# Patient Record
Sex: MALE | Race: WHITE | Employment: OTHER | URBAN - METROPOLITAN AREA
[De-identification: names, ages, dates, MRNs, and addresses within clinical notes are randomized per-mention and may not be internally consistent; named-entity substitution may affect disease eponyms.]

---

## 2017-02-21 ENCOUNTER — DOCUMENTATION ONLY (OUTPATIENT)
Dept: VASCULAR SURGERY | Facility: CLINIC | Age: 67
End: 2017-02-21

## 2017-04-19 ENCOUNTER — DOCUMENTATION ONLY (OUTPATIENT)
Dept: OTHER | Facility: CLINIC | Age: 67
End: 2017-04-19

## 2017-04-19 DIAGNOSIS — Z71.89 ACP (ADVANCE CARE PLANNING): Chronic | ICD-10-CM

## 2017-05-01 ENCOUNTER — TELEPHONE (OUTPATIENT)
Dept: FAMILY MEDICINE | Facility: CLINIC | Age: 67
End: 2017-05-01

## 2017-05-01 NOTE — LETTER
[facility]  [address] [place] [id number]  Phone: [phone]   May 9, 2017    Ernie Landeros  [address] [place] [address]      To Whom it May Concern,      We are writing this appeal for the coverage on the above patients claim for Abdominal Aortic Aneurysm.  This patient meets the criteria due to a family history of abdominal aneurysm.   Please reconsider the claim. Call with any questions. [phone]    Sincerely,          Nitin Chopra M.D.

## 2017-05-01 NOTE — TELEPHONE ENCOUNTER
Reason for Call:  Form, our goal is to have forms completed with 72 hours, however, some forms may require a visit or additional information.    Type of letter, form or note:  Appeal for Medicare    Who is the form from?: Patient    Where did the form come from: Patient or family brought in       What clinic location was the form placed at?: [place]    Where the form was placed: Pt called in    What number is listed as a contact on the form?: US 12/26/17 was denied and Pt is appealing it. The document says that the can send a note of why the US was ordered. Pt will pick the letter up. I asked if there was a form for the  To fill out and he said no.       Additional comments: Pt phone [phone]    Call taken on 5/1/2017 at 2:28 PM by Mary Hartley

## 2017-05-09 NOTE — TELEPHONE ENCOUNTER
Letter written.  Placed at . Message left at .      Terri Pagan RN    [facility]

## 2017-07-05 ENCOUNTER — TELEPHONE (OUTPATIENT)
Dept: FAMILY MEDICINE | Facility: CLINIC | Age: 67
End: 2017-07-05

## 2017-07-05 NOTE — TELEPHONE ENCOUNTER
Dizziness      Duration: x 4 days    Description   Feeling faint:  no   Feeling like the surroundings are moving: YES  Loss of consciousness or falls: no     Intensity:  mild    Accompanying signs and symptoms:   Nausea/vomitting: no   Palpitations: no   Weakness in arms or legs: no   Vision or speech changes: no   Ringing in ears (Tinnitus): no   Hearing loss related to dizziness: no   Other (fevers/chills/sweating/dyspnea): no     History (similar episodes/head trauma/previous evaluation/recent bleeding): Had once in the distant past    Precipitating or alleviating factors (new meds/chemicals): None  Worse with activity/head movement: YES, especially when moving head side to side when he is laying down    Therapies tried and outcome: Moving slowly and trying to not move head suddenly helps and laying down with eyes close helps        Patient denies chest pain, head trauma, diarrhea, loss of hearing, earache, weakness or numbness in arms or legs.  Denies fainting or loss of consciousness.    He has been able to drive as long as he does not move head suddenly side to side.    Home care instructions given to patient:   Sit up or stand slowly. Avoid any sudden changes in posture.  Avoid noisy environments.  Limit caffeine and alcohol intake.  Drink plenty of water or sports beverage if dehydrated.  Use a night light if needing to get up at night to avoid falling.  When you feel  Dizzy attack coming on stop moving for a few minutes. Reach out and lightly touch something solid and firm then sit down and stay still    Patient advised to be seen in clinic if problem persists more than one week or if dizziness worsens or if vomiting develops.  Patient advised to go to ER if he develops chest pain, decreased level of consciousness, weakness or difficulty speaking, heart rate lower than 50 or greater than 130 or an irregular heart rhythm.        Patient verbalized understanding and agrees with plan.    SELENE Yusuf,  RN, N  [facility]

## 2017-07-06 ENCOUNTER — OFFICE VISIT (OUTPATIENT)
Dept: FAMILY MEDICINE | Facility: CLINIC | Age: 67
End: 2017-07-06
Payer: COMMERCIAL

## 2017-07-06 VITALS
BODY MASS INDEX: 24.77 KG/M2 | OXYGEN SATURATION: 96 % | WEIGHT: 173 LBS | TEMPERATURE: 98.5 F | SYSTOLIC BLOOD PRESSURE: 102 MMHG | HEIGHT: 70 IN | DIASTOLIC BLOOD PRESSURE: 74 MMHG | HEART RATE: 71 BPM

## 2017-07-06 DIAGNOSIS — H81.10 BPPV (BENIGN PAROXYSMAL POSITIONAL VERTIGO), UNSPECIFIED LATERALITY: Primary | ICD-10-CM

## 2017-07-06 PROCEDURE — 99213 OFFICE O/P EST LOW 20 MIN: CPT | Performed by: FAMILY MEDICINE

## 2017-07-06 RX ORDER — MECLIZINE HYDROCHLORIDE 25 MG/1
25 TABLET ORAL EVERY 6 HOURS PRN
Qty: 30 TABLET | Refills: 1 | COMMUNITY
Start: 2017-07-06 | End: 2018-08-05

## 2017-07-06 NOTE — PROGRESS NOTES
SUBJECTIVE:                                                    Ernie Landeros is a 66 year old male who presents to clinic today for the following health issues:    Dizziness  Onset: 5 days    Description:   Do you feel faint:  no   Does it feel like the surroundings (bed, room) are moving: YES   Unsteady/off balance: YES- in the beginning was worse  Have you passed out or fallen: YES- almost    Intensity: mild, moderate    Progression of Symptoms:  improving    Accompanying Signs & Symptoms:  Heart palpitations: no   Nausea, vomiting: no   Weakness in arms or legs: no   Fatigue: no   Vision or speech changes: no   Ringing in ears (Tinnitus): no   Hearing Loss: no     History:   Head trauma/concussion hx: no   Previous similar symptoms: YES- last year for a day or so  Recent bleeding history: no     Precipitating factors:   Worse with activity or head movement: YES- makes vision blurry side to side - up and down  Any new medications (BP?): no   Alcohol/drug abuse/withdrawal: no     Alleviating factors:   Does staying in a fixed position give relief:  no     Therapies Tried and outcome: ibuprofen for dental work    The patient states that he has been experiencing dizziness with change in position and movement. He also has been feeling unsteady and having vision changes with symptoms. If the patient is in the supine position, he denies symptoms. He denies recent illness and tinnitus.     Chest Pain - Approximately one week ago the patient started having bilateral lower chest pain that lasted for five minutes while driving to work. He also experienced a cold sweat. He denies trauma to the area, nausea, heart palpitations, and cramping. The patient exercises regularly and he denies chest pain with exertion.       Problem list and histories reviewed & adjusted, as indicated.  Additional history: as documented      ROS:  Constitutional, HEENT, cardiovascular, pulmonary, GI, , musculoskeletal, neuro, skin, endocrine  "and psych systems are negative, except as otherwise noted.    This document serves as a record of the services and decisions personally performed and made by Fredrick Butt MD. It was created on his behalf by Ariella Baig, a trained medical scribe. The creation of this document is based on the provider's statements to the medical scribe.  Ariella Baig 4:39 PM 7/6/2017  OBJECTIVE:                                                    /74 (BP Location: Left arm, Patient Position: Chair, Cuff Size: Adult Large)  Pulse 71  Temp 98.5  F (36.9  C) (Oral)  Ht 1.778 m (5' 10\")  Wt 78.5 kg (173 lb)  SpO2 96%  BMI 24.82 kg/m2 Body mass index is 24.82 kg/(m^2).   GENERAL: healthy, alert, well nourished, well hydrated, no distress  HENT: positive David Hallpike test, otherwise ear canals- normal; TMs- normal; Nose- normal; Mouth- no ulcers, no lesions  NECK: no carotid bruit, no tenderness, no adenopathy, no asymmetry, no masses, no stiffness; thyroid- normal to palpation  RESP: lungs clear to auscultation - no rales, no rhonchi, no wheezes  CV: regular rates and rhythm, normal S1 S2, no S3 or S4 and no murmur, no click or rub -  ABDOMEN: soft, no tenderness, no  hepatosplenomegaly, no masses, normal bowel sounds  MS: extremities- no gross deformities noted, no edema  SKIN: no suspicious lesions, no rashes    Diagnostic test results:  none      ASSESSMENT/PLAN:         Ernie was seen today for dizziness.    Diagnoses and all orders for this visit:    BPPV (benign paroxysmal positional vertigo), unspecified laterality - Take meclizine as needed for dizziness. Consider physical therapy if symptoms persist.  -     meclizine (ANTIVERT) 25 MG tablet; Take 1 tablet (25 mg) by mouth every 6 hours as needed for dizziness      Risks, benefits and alternatives of treatments discussed. Plan agreed on.      Followup: As needed    Will call, return to clinic, or go to ED if worsening or symptoms not improving as " discussed.    See patient instructions.       The patient has no Health Maintenance topics of status Overdue, Due On, or Due Soon    Health maintenance reviewed/updated? Yes    The information in this document, created by a scribe for me, accurately reflects the services I personally performed and the decisions made by me. I have reviewed and approved this document for accuracy.      Norbert Butt MD

## 2017-07-06 NOTE — MR AVS SNAPSHOT
After Visit Summary   7/6/2017    Ernie Landeros    MRN: [id number]           Patient Information     Date Of Birth          1950        Visit Information        Provider Department      7/6/2017 4:20 PM Fredrick Butt MD [facility]        Today's Diagnoses     BPPV (benign paroxysmal positional vertigo), unspecified laterality    -  1      Care Instructions                Positional Vertigo          What is positional vertigo?   Positional vertigo is an inner ear problem. It causes brief but sometimes severe feelings of spinning. Some people feel that their head or body is spinning. Others feel the room is spinning. People often say they are dizzy, but dizzy is a very general term. Vertigo, on the other hand, is the very specific feeling of uncontrollable spinning.   Symptoms of positional vertigo happen suddenly when you change the position of your head.   How does it occur?   In the inner part of your ear are 3 semicircular canals. Movement of the fluid in these canals helps your brain maintain your balance and know what position you are in (for example, standing up, lying down, or standing on your head).   Sometimes small crystals of calcium develop and float in the fluid in the inner ear. This can happen after a head injury, with a severe cold, or simply as a part of normal aging. The crystals can cause vertigo when you change head position and they strike against nerve endings in the semicircular canals. Usually the calcium crystals dissolve in a few weeks and stop causing vertigo. However, sometimes the crystals do not dissolve and the vertigo returns from time to time.   What are the symptoms?   A sudden feeling that you are spinning, or that the room is spinning, is the main symptom. You may feel the vertigo when you first wake up. It may seem that any turn of your head brings on brief but intense spells of vertigo. It may happen when you tilt your head, look up or  down, or roll over in bed.   You may have nausea and vomiting along with the vertigo. Even if a spell of vertigo is brief, you may have a feeling of queasiness for several minutes or even hours afterward.   How is it diagnosed?   Your healthcare provider will ask about your symptoms and examine you. You may also be given a David-Hallpike position test.   You start the [place]-Hallpike test by sitting upright on the examining table. Your healthcare provider slowly brings your head down over the edge of the table and turns your head to one side. If you have positional vertigo, your provider will see your eyes making fast, jerky movements called nystagmus. If no nystagmus is seen, your provider will repeat the test, this time turning your head to the opposite side, to test the other inner ear. If you then have nystagmus and vertigo, the ear that is pointing toward the floor is the one causing the problem. The nystagmus and vertigo will slow down and stop after 15 to 20 seconds. If you do not move your head, no more symptoms will occur. When you sit back up, you will have vertigo again, but for a shorter time.   Other tests you may have are:   an ear exam   an audiogram to check your hearing   a test of your nerve responses   an electronystagmogram (ENG) test.   How is it treated?   Mild vertigo is often treated with medicine. The most common medicine for this problem is meclizine. It is taken up to 4 times a day for the vertigo and nausea or vomiting. One of the problems with this medicine is that it causes drowsiness. This is not as much of a problem if you have severe vertigo, which usually requires bed rest. Then the medicine can help you sleep and get relief from the vertigo while you sleep.   Your healthcare provider may recommend techniques that use gravity to move the crystals away from the nerve endings into an area of the inner ear that won't cause any problems. These are called repositioning techniques.   One  repositioning technique is the Epley maneuver. It can be very helpful. Your healthcare provider will move your head into 4 positions. You will hold each position for about 30 seconds.   Your healthcare provider may also suggest that you do Gomez-Daroff exercises. Your provider may recommend that you do these exercises 3 times a day for 2 weeks. To do these exercises:   Start by sitting upright on your bed.   Lie on your left side, with your head angled upward about residential. (Imagine that you are looking at the head of someone standing about 6 feet in front of you.) Stay in this position for 30 seconds, or, if you are having vertigo, until the vertigo stops.   Return to the sitting position for 30 seconds.   Lie on your right side, and follow the same routine.   Your healthcare provider may refer you to a physical therapist to learn and practice these repositioning techniques.   Rarely, when repositioning techniques don't help and the vertigo has not gone away after a few weeks, severe cases may eventually require surgery.   How long will the effects last?   Even without treatment, positional vertigo usually goes away within several weeks. Sometimes it recurs despite treatment.   How do I take care of myself?   If your vertigo is mild, you may be able to continue your usual activities, especially if you have opportunities to sit and rest when you have vertigo.   If your vertigo does not allow you to continue your usual routine, you should rest at home.   Use medicine as prescribed by your healthcare provider to help stop symptoms of dizziness, nausea, and vomiting.   Follow your instructions for using the repositioning techniques.   Do not try to drive, operate tools or machinery, or do other tasks, even cooking, that could endanger yourself or others if you suddenly become dizzy.   Follow your healthcare provider's recommendations for follow-up visits.   Contact your healthcare provider if:   Your symptoms seem to  "be getting worse, more frequent, or longer lasting.   You develop new symptoms, such as a loss of hearing or severe headache.     Published by [employer].  This content is reviewed periodically and is subject to change as new health information becomes available. The information is intended to inform and educate and is not a replacement for medical evaluation, advice, diagnosis or treatment by a healthcare professional.   Developed by [employer].   ? 2010 [facility] and/or its affiliates. All Rights Reserved.   Copyright   Clinical Reference Systems 2011  Adult Health Advisor                 Follow-ups after your visit        Follow-up notes from your care team     Return in about 2 weeks (around 7/20/2017).      Who to contact     If you have questions or need follow up information about today's clinic visit or your schedule please contact [facility] directly at [phone].  Normal or non-critical lab and imaging results will be communicated to you by Gamisfactionhart, letter or phone within 4 business days after the clinic has received the results. If you do not hear from us within 7 days, please contact the clinic through Gamisfactionhart or phone. If you have a critical or abnormal lab result, we will notify you by phone as soon as possible.  Submit refill requests through Baihe or call your pharmacy and they will forward the refill request to us. Please allow 3 business days for your refill to be completed.          Additional Information About Your Visit        Baihe Information     Baihe lets you send messages to your doctor, view your test results, renew your prescriptions, schedule appointments and more. To sign up, go to [email or web address] . Click on \"Log in\" on the left side of the screen, which will take you to the Welcome page. Then click on \"Sign up Now\" on the right side of the page.     You will be asked to enter the access code listed below, as well as some personal information. " "Please follow the directions to create your username and password.     Your access code is: [email or web address]  Expires: 10/4/2017  4:56 PM     Your access code will  in 90 days. If you need help or a new code, please call your [place] clinic or [phone].        Care EveryWhere ID     This is your Care EveryWhere ID. This could be used by other organizations to access your [place] medical records  [id number]        Your Vitals Were     Pulse Temperature Height Pulse Oximetry BMI (Body Mass Index)       71 98.5  F (36.9  C) (Oral) 5' 10\" (1.778 m) 96% 24.82 kg/m2        Blood Pressure from Last 3 Encounters:   17 102/74   16 122/78   09/21/15 116/70    Weight from Last 3 Encounters:   17 173 lb (78.5 kg)   16 180 lb (81.6 kg)   09/21/15 183 lb (83 kg)              Today, you had the following     No orders found for display         Today's Medication Changes          These changes are accurate as of: 17  4:56 PM.  If you have any questions, ask your nurse or doctor.               Start taking these medicines.        Dose/Directions    meclizine 25 MG tablet   Commonly known as:  ANTIVERT   Used for:  BPPV (benign paroxysmal positional vertigo), unspecified laterality   Started by:  Fredrick Butt MD        Dose:  25 mg   Take 1 tablet (25 mg) by mouth every 6 hours as needed for dizziness   Quantity:  30 tablet   Refills:  1            Where to get your medicines      Some of these will need a paper prescription and others can be bought over the counter.  Ask your nurse if you have questions.     You don't need a prescription for these medications     meclizine 25 MG tablet                Primary Care Provider Office Phone # Fax #    Nitin Chopra [phone]       [address] [address]        Equal Access to Services     [facility] AH: Hadii chance willo Soomaali, waaxda luqadaha, qaybta kaalmada anand, kristen " channing olivojermaine aguilaraan ah. So [facility] [phone].    ATENCIÓN: Si jesenia vázquez, tiene a kohli disposición servicios gratuitos de asistencia lingüística. [place] al [phone].    We comply with applicable federal civil rights laws and [place] laws. We do not discriminate on the basis of race, color, national origin, age, disability sex, sexual orientation or gender identity.            Thank you!     Thank you for choosing [facility]  for your care. Our goal is always to provide you with excellent care. Hearing back from our patients is one way we can continue to improve our services. Please take a few minutes to complete the written survey that you may receive in the mail after your visit with us. Thank you!             Your Updated Medication List - Protect others around you: Learn how to safely use, store and throw away your medicines at www.disposemymeds.org.          This list is accurate as of: 7/6/17  4:56 PM.  Always use your most recent med list.                   Brand Name Dispense Instructions for use Diagnosis    ascorbic acid 500 MG tablet    VITAMIN C    100 tablet    Take 1 tablet by mouth daily.        aspirin  MG EC tablet     90 tablet    Take 1 tablet (325 mg) by mouth daily        calcium carbonate 1250 MG tablet    OS-MICHELLE 500 mg [place]. Ca    3 MONTHS    unsure of mgs        fish oil-omega-3 fatty acids 1000 MG capsule      unsure of mgs        Garlic 100 MG Tabs      unsure of mgs        meclizine 25 MG tablet    ANTIVERT    30 tablet    Take 1 tablet (25 mg) by mouth every 6 hours as needed for dizziness    BPPV (benign paroxysmal positional vertigo), unspecified laterality       Saw Plato (Serenoa repens) 1000 MG Caps      unsure of mgs        Vitamin B-12 1000 MCG/ML Soln      po unsure of mgs

## 2017-07-06 NOTE — NURSING NOTE
"Chief Complaint   Patient presents with     Dizziness       Initial /74 (BP Location: Left arm, Patient Position: Chair, Cuff Size: Adult Large)  Pulse 71  Temp 98.5  F (36.9  C) (Oral)  Ht 5' 10\" (1.778 m)  Wt 173 lb (78.5 kg)  SpO2 96%  BMI 24.82 kg/m2 Estimated body mass index is 24.82 kg/(m^2) as calculated from the following:    Height as of this encounter: 5' 10\" (1.778 m).    Weight as of this encounter: 173 lb (78.5 kg).  Medication Reconciliation: complete  "

## 2017-07-06 NOTE — PATIENT INSTRUCTIONS
Positional Vertigo          What is positional vertigo?   Positional vertigo is an inner ear problem. It causes brief but sometimes severe feelings of spinning. Some people feel that their head or body is spinning. Others feel the room is spinning. People often say they are dizzy, but dizzy is a very general term. Vertigo, on the other hand, is the very specific feeling of uncontrollable spinning.   Symptoms of positional vertigo happen suddenly when you change the position of your head.   How does it occur?   In the inner part of your ear are 3 semicircular canals. Movement of the fluid in these canals helps your brain maintain your balance and know what position you are in (for example, standing up, lying down, or standing on your head).   Sometimes small crystals of calcium develop and float in the fluid in the inner ear. This can happen after a head injury, with a severe cold, or simply as a part of normal aging. The crystals can cause vertigo when you change head position and they strike against nerve endings in the semicircular canals. Usually the calcium crystals dissolve in a few weeks and stop causing vertigo. However, sometimes the crystals do not dissolve and the vertigo returns from time to time.   What are the symptoms?   A sudden feeling that you are spinning, or that the room is spinning, is the main symptom. You may feel the vertigo when you first wake up. It may seem that any turn of your head brings on brief but intense spells of vertigo. It may happen when you tilt your head, look up or down, or roll over in bed.   You may have nausea and vomiting along with the vertigo. Even if a spell of vertigo is brief, you may have a feeling of queasiness for several minutes or even hours afterward.   How is it diagnosed?   Your healthcare provider will ask about your symptoms and examine you. You may also be given a David-Hallpike position test.   You start the [place]-Hallpike test by sitting upright  on the examining table. Your healthcare provider slowly brings your head down over the edge of the table and turns your head to one side. If you have positional vertigo, your provider will see your eyes making fast, jerky movements called nystagmus. If no nystagmus is seen, your provider will repeat the test, this time turning your head to the opposite side, to test the other inner ear. If you then have nystagmus and vertigo, the ear that is pointing toward the floor is the one causing the problem. The nystagmus and vertigo will slow down and stop after 15 to 20 seconds. If you do not move your head, no more symptoms will occur. When you sit back up, you will have vertigo again, but for a shorter time.   Other tests you may have are:   an ear exam   an audiogram to check your hearing   a test of your nerve responses   an electronystagmogram (ENG) test.   How is it treated?   Mild vertigo is often treated with medicine. The most common medicine for this problem is meclizine. It is taken up to 4 times a day for the vertigo and nausea or vomiting. One of the problems with this medicine is that it causes drowsiness. This is not as much of a problem if you have severe vertigo, which usually requires bed rest. Then the medicine can help you sleep and get relief from the vertigo while you sleep.   Your healthcare provider may recommend techniques that use gravity to move the crystals away from the nerve endings into an area of the inner ear that won't cause any problems. These are called repositioning techniques.   One repositioning technique is the Epley maneuver. It can be very helpful. Your healthcare provider will move your head into 4 positions. You will hold each position for about 30 seconds.   Your healthcare provider may also suggest that you do Gomez-Daroff exercises. Your provider may recommend that you do these exercises 3 times a day for 2 weeks. To do these exercises:   Start by sitting upright on your bed.    Lie on your left side, with your head angled upward about correction. (Imagine that you are looking at the head of someone standing about 6 feet in front of you.) Stay in this position for 30 seconds, or, if you are having vertigo, until the vertigo stops.   Return to the sitting position for 30 seconds.   Lie on your right side, and follow the same routine.   Your healthcare provider may refer you to a physical therapist to learn and practice these repositioning techniques.   Rarely, when repositioning techniques don't help and the vertigo has not gone away after a few weeks, severe cases may eventually require surgery.   How long will the effects last?   Even without treatment, positional vertigo usually goes away within several weeks. Sometimes it recurs despite treatment.   How do I take care of myself?   If your vertigo is mild, you may be able to continue your usual activities, especially if you have opportunities to sit and rest when you have vertigo.   If your vertigo does not allow you to continue your usual routine, you should rest at home.   Use medicine as prescribed by your healthcare provider to help stop symptoms of dizziness, nausea, and vomiting.   Follow your instructions for using the repositioning techniques.   Do not try to drive, operate tools or machinery, or do other tasks, even cooking, that could endanger yourself or others if you suddenly become dizzy.   Follow your healthcare provider's recommendations for follow-up visits.   Contact your healthcare provider if:   Your symptoms seem to be getting worse, more frequent, or longer lasting.   You develop new symptoms, such as a loss of hearing or severe headache.     Published by [employer].  This content is reviewed periodically and is subject to change as new health information becomes available. The information is intended to inform and educate and is not a replacement for medical evaluation, advice, diagnosis or treatment by a healthcare  professional.   Developed by [employer].   ? 2010 [employer] and/or its affiliates. All Rights Reserved.   Copyright   [employer] 2011  [facility]ult [employer] [facility]

## 2018-02-01 ENCOUNTER — OFFICE VISIT (OUTPATIENT)
Dept: FAMILY MEDICINE | Facility: CLINIC | Age: 68
End: 2018-02-01
Payer: COMMERCIAL

## 2018-02-01 VITALS
SYSTOLIC BLOOD PRESSURE: 112 MMHG | OXYGEN SATURATION: 96 % | WEIGHT: 178 LBS | HEART RATE: 77 BPM | HEIGHT: 70 IN | BODY MASS INDEX: 25.48 KG/M2 | DIASTOLIC BLOOD PRESSURE: 70 MMHG | TEMPERATURE: 98.1 F

## 2018-02-01 DIAGNOSIS — D48.5 NEOPLASM OF UNCERTAIN BEHAVIOR OF SKIN: Primary | ICD-10-CM

## 2018-02-01 DIAGNOSIS — L98.9 CHANGING SKIN LESION: ICD-10-CM

## 2018-02-01 PROCEDURE — 88305 TISSUE EXAM BY PATHOLOGIST: CPT | Performed by: FAMILY MEDICINE

## 2018-02-01 PROCEDURE — 11302 SHAVE SKIN LESION 1.1-2.0 CM: CPT | Performed by: FAMILY MEDICINE

## 2018-02-01 NOTE — NURSING NOTE
"Chief Complaint   Patient presents with     Derm Problem       Initial /70  Pulse 77  Temp 98.1  F (36.7  C) (Oral)  Ht 5' 10\" (1.778 m)  Wt 178 lb (80.7 kg)  SpO2 96%  BMI 25.54 kg/m2 Estimated body mass index is 25.54 kg/(m^2) as calculated from the following:    Height as of this encounter: 5' 10\" (1.778 m).    Weight as of this encounter: 178 lb (80.7 kg).  Medication Reconciliation: complete  "

## 2018-02-01 NOTE — PROGRESS NOTES
SUBJECTIVE:   Ernie Landeros is a 67 year old male who presents to clinic today for the following health issues:      Lesion/mole     Onset: pt unsure, wife just noticed it a couple of weeks ago     Description:   Location: Rt Shoulder lateral mid upper arm        Color: skin colored red with scaling        Border description: irregular border, 11 by 11 mm    History:    Has it spread/changed:  Pt unsure  Any previous history of skin cancer: no  Any family history of melanoma: no    Accompanying Signs & Symptoms:   Fever: no  Bleeding: no  Scaling: no  Excessive sun exposure/tanning: live in [place] in [place]  Sunscreen used: no      Therapies tried and outcome:  Nothing tried       Problem list and histories reviewed & adjusted, as indicated.  Additional history: as documented        Reviewed and updated as needed this visit by clinical staff  Allergies  Meds  Med Hx       Reviewed and updated as needed this visit by Provider         BP Readings from Last 3 Encounters:   02/01/18 112/70   07/06/17 102/74   12/21/16 122/78       Wt Readings from Last 4 Encounters:   02/01/18 178 lb (80.7 kg)   07/06/17 173 lb (78.5 kg)   12/21/16 180 lb (81.6 kg)   09/21/15 183 lb (83 kg)       Health Maintenance    Health Maintenance Due   Topic Date Due     FALL RISK ASSESSMENT  12/21/2017     PSA Q1 YR  12/21/2017     WELLNESS VISIT Q1 YR  12/21/2017     FIT Q1 YR  12/23/2017       Current Problem List    Patient Active Problem List   Diagnosis     CARDIOVASCULAR SCREENING; LDL GOAL LESS THAN 130     ACP (advance care planning)       Past Medical History    Past Medical History:   Diagnosis Date     CARDIOVASCULAR SCREENING; LDL GOAL LESS THAN 130        Past Surgical History    Past Surgical History:   Procedure Laterality Date     HC COLONOSCOPY THRU STOMA, DIAGNOSTIC  12/04, 4/14    normal - Dr Valente - due 2024     HC REMOVE TONSILS/ADENOIDS,<13 Y/O  1958    T & A <13y.o.     HC REPAIR ING HERNIA,5+Y/O,REDUCIBL  1955     bilateral inguinal hernia     SURGICAL HISTORY OF -   8/05    Lt Knee tibial plateua fx - dr morales     SURGICAL HISTORY OF -   2009    Lt carpal tunnel - dr morales     SURGICAL HISTORY OF -   1/14    Rt carpal tunnel - dr morales       Current Medications    Current Outpatient Prescriptions   Medication Sig Dispense Refill     meclizine (ANTIVERT) 25 MG tablet Take 1 tablet (25 mg) by mouth every 6 hours as needed for dizziness 30 tablet 1     aspirin  MG EC tablet Take 1 tablet (325 mg) by mouth daily 90 tablet 3     ascorbic acid (VITAMIN C) 500 MG tablet Take 1 tablet by mouth daily. 100 tablet 12     CALCIUM 500 MG OR TABS unsure of mgs 3 MONTHS 1 YEAR     SAW PALMETTO (SERENOA REPENS) 1000 MG OR CAPS unsure of mgs  0     GARLIC 100 MG OR TABS unsure of mgs  0     FISH OIL 1000 MG OR CAPS unsure of mgs  0     VITAMIN B-12 1000 MCG/ML IJ SOLN po unsure of mgs  0       Allergies    No Known Allergies    Immunizations    Immunization History   Administered Date(s) Administered     Influenza (High Dose) 3 valent vaccine 09/07/2017     Influenza (IIV3) PF 11/25/2003, 10/17/2006, 10/01/2009, 09/22/2010, 09/09/2011, 09/19/2012     Influenza Vaccine IM 3yrs+ 4 Valent IIV4 09/18/2013, 09/18/2014, 10/05/2015     Pneumo Conj 13-V (2010&after) 12/21/2016     Pneumococcal 23 valent 05/02/2014     TD (ADULT, 7+) 10/17/2003     TDAP Vaccine (Boostrix) 05/02/2012     Twinrix A/B 10/17/2003, 11/25/2003, 04/16/2004     Typhoid IM 10/17/2003     Zoster vaccine, live 10/05/2015       Family History    Family History   Problem Relation Age of Onset     Arthritis Mother      Neurologic Disorder Father      Parkinsons     Hypertension Father      Hypertension Brother      Lipids Brother      HEART DISEASE Maternal Grandfather        Social History    Social History     Social History     Marital status:      Spouse name: Ariana     Number of children: 2     Years of education: 18     Occupational History      Self  "    Social History Main Topics     Smoking status: Never Smoker     Smokeless tobacco: Never Used     Alcohol use No     Drug use: No     Sexual activity: Yes     Partners: Female     Other Topics Concern     Caffeine Concern Yes     2 cups a day     Exercise Yes     bikes walks lifts wts 3-5 times per week - 10 miles per week     Seat Belt Yes     Parent/Sibling W/ Cabg, Mi Or Angioplasty Before 65f 55m? No     Social History Narrative       All above reviewed and updated, all stable unless otherwise noted    Recent labs reviewed    ROS:  Constitutional, HEENT, cardiovascular, pulmonary, GI, , musculoskeletal, neuro, skin, endocrine and psych systems are negative, except as otherwise noted.    This document serves as a record of the services and decisions personally performed and made by Nitin Chopra MD. It was created on their behalf by Daniela Walton, a trained medical scribe. The creation of this document is based the provider's statements to the medical scribe.  Daniela Walton February 1, 2018 11:18 AM      OBJECTIVE:                                                    /70  Pulse 77  Temp 98.1  F (36.7  C) (Oral)  Ht 5' 10\" (1.778 m)  Wt 178 lb (80.7 kg)  SpO2 96%  BMI 25.54 kg/m2  Body mass index is 25.54 kg/(m^2).  GENERAL: healthy, alert and no distress  CV/LUNG - Negative  SKIN: red, flat, scaly on lower half lesion on right upper lateral mid arm measuring 11 x 11 mm  DIAGNOSTICS/PROCEDURES:                                                      Procedure:  shave biopsy    Consent:  Risks and benefits of procedure discussed, including:  allergic reaction, bleeding, infection, nerve injury, perforation and scarring    Pattient desires to proceed, pause for cause completed,    Procedure completed in room: shave biopsy    Prep:  vika  Anesthesia:  1% lidocaine with epi    Notes:  Dermablade used to remove the lesion. Hemostasis achieved with aluminum chloride. Patient tolerated " procedure well.     Wound repaired with:  Bacitracin Ointment and Dressing(s)    Wound care discussed    Path Sent:  Yes         ASSESSMENT/PLAN:                                                        ICD-10-CM    1. Neoplasm of uncertain behavior of skin D48.5 BIOPSY SKIN/SUBQ/MUC MEM, SINGLE LESION     Surgical pathology exam   2. Changing skin lesion L98.9 BIOPSY SKIN/SUBQ/MUC MEM, SINGLE LESION     Surgical pathology exam       Discussed treatment/modality options, including risk and benefits, he desires shave biopsy of changing skin lesion, wound cares reviewed. All diagnosis above reviewed and noted above, otherwise stable.  See [employer] orders for further details.  Follow up pending pathology.    Health Maintenance Due   Topic Date Due     FALL RISK ASSESSMENT  12/21/2017     PSA Q1 YR  12/21/2017     WELLNESS VISIT Q1 YR  12/21/2017     FIT Q1 YR  12/23/2017     The information in this document, created by the medical scribe for me, accurately reflects the services I personally performed and the decisions made by me. I have reviewed and approved this document for accuracy.   Nitin Chopra MD FAAFP            Nitin Chopra MD FAA[address]  [id number] [phone] Fax

## 2018-02-01 NOTE — PATIENT INSTRUCTIONS
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     Patient Instructions     WOUND CARE INSTRUCTIONS  1. After 24 hours, change dressing daily.  2. Wash hands before every dressing change.  3. Wash the wound area with a mild soap, then rinse  4. Gently pat dry with a sterile gauze or Q-tip.  5. Apply Vaseline or Aquaphor only over entire wound. Do NOT use Neosporin - as many people react to neomycin.  6. Finally, cover with a bandage or sterile non-stick gauze with micropore paper tape.  7. Repeat once daily until wound has healed.     Do not let the wound dry out.  The wound will heal faster and with better cosmetic results if routinely kept moist with step #5. It is a false belief that a wound heals better when it is exposed to air and allowed to dry out.       Soap, water and shampoo will not hurt this area.    Do not go swimming or take baths, but showering is encouraged.    Limit use of the area where the procedure was done for a few days to allow for optimal healing.     If you experience bleeding:  Repeat steps #2-5 and hold firm pressure on the area for 10 minutes without checking to see if the bleeding has stopped. \"Checking\" pulls off the protective wound clot and restarts the bleeding all over again. Re-apply pressure for 10 minutes if necessary to stop bleeding.  Use additional sterile gauze and tape to maintain pressure once bleeding has stopped.     Signs of Infection:  Infection can occur in any area where skin has been disrupted.  If you notice persistent redness, swelling, colored drainage, increasing pain, fever or other signs of infection, please call us at: [phone] and ask to have me or my colleague paged. We will call you back to discuss.     Pathology Results:  You will be notified, generally via letter or MyChart, in approximately 10 days. If there is anything we need to discuss or further treatment needed, I will call you to discuss it.     Skin tissue can be some of the " most challenging tissue for pathologists to examine, therefore we may use a specialist outside the [facility] system to read certain submitted tissue samples. When submitted to these outside specialists, [email or web address] will notify you that your tissue sample result has returned. However, this only means that the tissue sample has been sent to the specialist. These results are not available in [email or web address], so I will contact you when I receive the final report.     PATIENT INFORMATION : WOUNDS  During the healing process you will notice a number of changes. All wounds develop a small halo of redness surrounding the wound.  This means healing is occurring. Severe itching with extensive redness usually indicates sensitivity to the ointment or bandage tape used to dress the wound.  You should call our office if this develops.       Swelling  and/or discoloration around your surgical site is common, particularly when performed around the eye.     All wounds normally drain.  The larger the wound the more drainage there will be.  After 7-10 days, you will notice the wound beginning to shrink and new skin will begin to grow.  The wound is healed when you can see skin has formed over the entire area.  A healed wound has a healthy, shiny look to the surface and is red to dark pink in color to normalize.  Wounds may take approximately 4-6 weeks to heal.  Larger wounds may take 6-8 weeks. After the wound is healed you may discontinue dressing changes.     You may experience a sensation of tightness as your wound heals. This is normal and will gradually subside.     Your healed wound may be sensitive to temperature changes. This sensitivity improves with time, but if you re having a lot of discomfort, try to avoid temperature extremes.     Patients frequently experience itching after their wound appears to have healed because of the continue healing under the skin.  Plain Vaseline will help relieve the itching.     PAIN CONTROL  "INSTRUCTIONS    First, try either acetaminophen (Tylenol), or NSAID ibuprofen (Advil, Batool, etc), or NSAID naproxen (Aleve, Naprosyn)    Acetaminophen dosage : one 325-500 mg tablet taken every 4-6 hours with a maximum of 4000 mg/day = 4 g/day    Ibuprofen dosage : one 600 mg tablet taken every 4-6 hours with a maximum of 4-6 tablets/day    Naproxen dosage : one 500 mg tablet taken twice daily with a maximum of 2 tablets/day     Second, try combining acetaminophen and an NSAID - often the combination will work better then either one alone.     SUN PROTECTION INSTRUCTIONS  Sun damage can lead to skin cancer and premature aging of the skin.       The best way to protect from sun damage to your skin is to avoid the sun during peak hours (10 am - 2 pm) even on overcast days.       Use UPF sun-protective clothing, which while more expensive initially provides longer lasting coverage without having to worry about remembering to re-apply.  1. Wear a wide-brimmed hat and sunglasses.   2. Wear sun-protective clothing.  [employer] and other [employer] make sun protective clothing that are stylish, comfortable and cool. [employer] and other [employer] make UV arm sleeves suitable for golfing, gardening and other activities.       Sunscreen instructions:  1. Use sunscreens with Zinc Oxide, Titanium Dioxide or Avobenzone to protect from UVA rays.  2. Use SPF 30-50+ to protect from UVB rays.  3. Re-apply every 2 hours even if water resistant.  4. Apply on your face every day even when cloudy and even in the winter. UVA \"aging rays\" penetrate window glass and are just as strong in the winter as in the summer.     Product Recommendations:    Good examples include: Blue Lizard, EltaMD, Solbar    Good daily moisturizers with SPF: Vanicream, CeraVe.    For sensitive skin, consider : Skin[employer] Essential Defense Mineral Shield Broad Spectrum SPF 35       Never use tanning beds. Tanning beds are associated with much higher " "risks of skin cancer.    All tanning damages the skin. Aim for ivory skin year round and you will have less trouble with your skin in years to come. There is no merit in getting \"a base tan\" before a warm weather vacation, as any tanning indicates your body's response to sun damage.    Stop smoking. Smokers have higher rates of skin cancer and also have premature skin wrinkling.     FYI  You should use about 3 tablespoons of sunscreen to protect your whole body. Thus a typical eight ounce bottle of sunscreen should last 4 applications. Remember, that the SPF rating is compromised if you don t apply enough. Most people only apply 1/2 - 1/3 of the amount they need. Also don t forget areas such as your ears, feet, upper back and harder to reach places. Keep in mind that these amounts should be increased for larger body sizes.     Sunscreens with titanium dioxide and/or zinc oxide in the active ingredients are physical blockers as opposed to chemical blockers. Chemical-free sunscreens should not irritate the skin.     Spray-on sunscreens may be used for touch-up application only, not as a base layer. Also, use with caution around small children due to inhalation risk.     Avoid retinyl palmitate products.     Avoid combination products that include both sunscreen and insect repellant, as sunscreen should be applied every 2 hours, but insect repellant should not be applied as frequently.     SPF means sun protection factor, which is just the degree to which the sunscreen can protect against UVB rays. There is no rating system for UVA rays. SPF is calculated as the time skin will burn when sunscreen is applied vs. skin without sunscreen.     Water resistant sunscreens should be re-applied every 1-2 hours.     For more information:  http://www.skincancer.org/prevention/sun-protection/sunscreen/sunscreens-safe-and-effective     SKIN CANCER SELF-EXAM INSTRUCTIONS  Check every month in the mirror or with a household member. " Be aware of any changes, especially bleeding or tenderness. Also, make sure to check your nails for color changes after removal of nail polish.     For melanoma, check for:  A - Asymmetry. One half unlike the other half.  B - Border. Irregular, scalloped, ragged, notched, blurred or poorly defined borders.  C - Color. Color variations from one area to another, with shades of tan, brown and/or black present. Sometimes white, red or blue.  D - Diameter. Greater than 6 mm (about the size of a pencil eraser). Any new growth of a mole should be concerning and be evaluated.  E - Evolving. A mole or skin lesion that looks different from the rest or is changing in size, shape or color.     For basal cell carcinoma and squamous cell carcinoma, check for:    Sores, shiny bumps, nodules, scaly lesions, or wart-like growths that are itchy, tender, crusting, scabbing, eroding, oozing or bleeding.    Open sores/wounds or reddish/irritated areas that do not heal within 2-3 weeks.    Scar-like areas that are white, yellow or waxy in color.     The patient was counseled about sunscreens and sun avoidance. The patient was counseled to check the skin regularly and report any lesion that is new, changing, itching, scabbing, bleeding or otherwise bothersome. The patient was discharged ambulatory and in stable condition.     Recommendations : q1 year skin exams.        [facility]                        To reach your care team during and after hours:   [phone]  To reach our pharmacy:        [phone]    Clinic Hours                        Our clinic hours are:    Monday   7:30 am to 7:00 pm                  Tuesday through Friday 7:30 am to 5:00 pm                             Saturday   8:00 am to 12:00 pm      Sunday   Closed      Pharmacy Hours                        Our pharmacy hours are:    Monday   8:30 am to 7:00 pm       Tuesday to Friday  8:30 am to 6:00 pm                       Saturday    9:00 am to  1:00 pm              Sunday    Closed              There is also information available at our web site:  [email or web address]    If your provider ordered any lab tests and you do not receive the results within 10 business days, please call the clinic.    If you need a medication refill please contact your pharmacy.  Please allow 2-3 business days for your refill to be completed.    Our clinic offers telephone visits and e visits.  Please ask one of your team members to explain more.      Use Home Delivery Service (HDS) (secure email communication and access to your chart) to send your primary care provider a message or make an appointment. Ask someone on your Team how to sign up for [facility] Service (HDS).  Immunizations                      Immunization History   Administered Date(s) Administered     Influenza (High Dose) 3 valent vaccine 09/07/2017     Influenza (IIV3) PF 11/25/2003, 10/17/2006, 10/01/2009, 09/22/2010, 09/09/2011, 09/19/2012     Influenza Vaccine IM 3yrs+ 4 Valent IIV4 09/18/2013, 09/18/2014, 10/05/2015     Pneumo Conj 13-V (2010&after) 12/21/2016     Pneumococcal 23 valent 05/02/2014     TD (ADULT, 7+) 10/17/2003     TDAP Vaccine (Boostrix) 05/02/2012     Twinrix A/B 10/17/2003, 11/25/2003, 04/16/2004     Typhoid IM 10/17/2003     Zoster vaccine, live 10/05/2015        Health Maintenance                       Health Maintenance Due   Topic Date Due     FALL RISK ASSESSMENT  12/21/2017     PSA Q1 YR  12/21/2017     WELLNESS VISIT Q1 YR  12/21/2017     FIT Q1 YR  12/23/2017

## 2018-02-01 NOTE — MR AVS SNAPSHOT
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     Patient Instructions     WOUND CARE INSTRUCTIONS  1. After 24 hours, change dressing daily.  2. Wash hands before every dressing change.  3. Wash the wound area with a mild soap, then rinse  4. Gently pat dry with a sterile gauze or Q-tip.  5. Apply Vaseline or Aquaphor only over entire wound. Do NOT use Neosporin - as many people react to neomycin.  6. Finally, cover with a bandage or sterile non-stick gauze with micropore paper tape.  7. Repeat once daily until wound has healed.     Do not let the wound dry out.  The wound will heal faster and with better cosmetic results if routinely kept moist with step #5. It is a false belief that a wound heals better when it is exposed to air and allowed to dry out.       Soap, water and shampoo will not hurt this area.    Do not go swimming or take baths, but showering is encouraged.    Limit use of the area where the procedure was done for a few days to allow for optimal healing.     If you experience bleeding:  Repeat steps #2-5 and hold firm pressure on the area for 10 minutes without checking to see if the bleeding has stopped. \"Checking\" pulls off the protective wound clot and restarts the bleeding all over again. Re-apply pressure for 10 minutes if necessary to stop bleeding.  Use additional sterile gauze and tape to maintain pressure once bleeding has stopped.     Signs of Infection:  Infection can occur in any area where skin has been disrupted.  If you notice persistent redness, swelling, colored drainage, " increasing pain, fever or other signs of infection, please call us at: [phone] and ask to have me or my colleague paged. We will call you back to discuss.     Pathology Results:  You will be notified, generally via letter or MyChart, in approximately 10 days. If there is anything we need to discuss or further treatment needed, I will call you to discuss it.     Skin tissue can be some of the most challenging tissue for pathologists to examine, therefore we may use a specialist outside the [facility] system to read certain submitted tissue samples. When submitted to these outside specialists, [facility] will notify you that your tissue sample result has returned. However, this only means that the tissue sample has been sent to the specialist. These results are not available in [facility], so I will contact you when I receive the final report.     PATIENT INFORMATION : WOUNDS  During the healing process you will notice a number of changes. All wounds develop a small halo of redness surrounding the wound.  This means healing is occurring. Severe itching with extensive redness usually indicates sensitivity to the ointment or bandage tape used to dress the wound.  You should call our office if this develops.       Swelling  and/or discoloration around your surgical site is common, particularly when performed around the eye.     All wounds normally drain.  The larger the wound the more drainage there will be.  After 7-10 days, you will notice the wound beginning to shrink and new skin will begin to grow.  The wound is healed when you can see skin has formed over the entire area.  A healed wound has a healthy, shiny look to the surface and is red to dark pink in color to normalize.  Wounds may take approximately 4-6 weeks to heal.  Larger wounds may take 6-8 weeks. After the wound is healed you may discontinue dressing changes.     You may experience a sensation of tightness as your wound heals. This is normal and will  "gradually subside.     Your healed wound may be sensitive to temperature changes. This sensitivity improves with time, but if you re having a lot of discomfort, try to avoid temperature extremes.     Patients frequently experience itching after their wound appears to have healed because of the continue healing under the skin.  Plain Vaseline will help relieve the itching.     PAIN CONTROL INSTRUCTIONS    First, try either acetaminophen (Tylenol), or NSAID ibuprofen (Advil, Batool, etc), or NSAID naproxen (Aleve, Naprosyn)    Acetaminophen dosage : one 325-500 mg tablet taken every 4-6 hours with a maximum of 4000 mg/day = 4 g/day    Ibuprofen dosage : one 600 mg tablet taken every 4-6 hours with a maximum of 4-6 tablets/day    Naproxen dosage : one 500 mg tablet taken twice daily with a maximum of 2 tablets/day     Second, try combining acetaminophen and an NSAID - often the combination will work better then either one alone.     SUN PROTECTION INSTRUCTIONS  Sun damage can lead to skin cancer and premature aging of the skin.       The best way to protect from sun damage to your skin is to avoid the sun during peak hours (10 am - 2 pm) even on overcast days.       Use UPF sun-protective clothing, which while more expensive initially provides longer lasting coverage without having to worry about remembering to re-apply.  1. Wear a wide-brimmed hat and sunglasses.   2. Wear sun-protective clothing.  [email or web address] and other [employer] make sun protective clothing that are stylish, comfortable and cool. [employer] and other [employer] make UV arm sleeves suitable for golfing, gardening and other activities.       Sunscreen instructions:  1. Use sunscreens with Zinc Oxide, Titanium Dioxide or Avobenzone to protect from UVA rays.  2. Use SPF 30-50+ to protect from UVB rays.  3. Re-apply every 2 hours even if water resistant.  4. Apply on your face every day even when cloudy and even in the winter. UVA \"aging rays\" " "penetrate window glass and are just as strong in the winter as in the summer.     Product Recommendations:    Good examples include: Blue Lizard, EltaMD, Solbar    Good daily moisturizers with SPF: Vanicream, CeraVe.    For sensitive skin, consider : SkinMedica Essential Defense Mineral Shield Broad Spectrum SPF 35       Never use tanning beds. Tanning beds are associated with much higher risks of skin cancer.    All tanning damages the skin. Aim for ivory skin year round and you will have less trouble with your skin in years to come. There is no merit in getting \"a base tan\" before a warm weather vacation, as any tanning indicates your body's response to sun damage.    Stop smoking. Smokers have higher rates of skin cancer and also have premature skin wrinkling.     FYI  You should use about 3 tablespoons of sunscreen to protect your whole body. Thus a typical eight ounce bottle of sunscreen should last 4 applications. Remember, that the SPF rating is compromised if you don t apply enough. Most people only apply 1/2 - 1/3 of the amount they need. Also don t forget areas such as your ears, feet, upper back and harder to reach places. Keep in mind that these amounts should be increased for larger body sizes.     Sunscreens with titanium dioxide and/or zinc oxide in the active ingredients are physical blockers as opposed to chemical blockers. Chemical-free sunscreens should not irritate the skin.     Spray-on sunscreens may be used for touch-up application only, not as a base layer. Also, use with caution around small children due to inhalation risk.     Avoid retinyl palmitate products.     Avoid combination products that include both sunscreen and insect repellant, as sunscreen should be applied every 2 hours, but insect repellant should not be applied as frequently.     SPF means sun protection factor, which is just the degree to which the sunscreen can protect against UVB rays. There is no rating system for UVA " rays. SPF is calculated as the time skin will burn when sunscreen is applied vs. skin without sunscreen.     Water resistant sunscreens should be re-applied every 1-2 hours.     For more information:  http://www.skincancer.org/prevention/sun-protection/sunscreen/sunscreens-safe-and-effective     SKIN CANCER SELF-EXAM INSTRUCTIONS  Check every month in the mirror or with a household member. Be aware of any changes, especially bleeding or tenderness. Also, make sure to check your nails for color changes after removal of nail polish.     For melanoma, check for:  A - Asymmetry. One half unlike the other half.  B - Border. Irregular, scalloped, ragged, notched, blurred or poorly defined borders.  C - Color. Color variations from one area to another, with shades of tan, brown and/or black present. Sometimes white, red or blue.  D - Diameter. Greater than 6 mm (about the size of a pencil eraser). Any new growth of a mole should be concerning and be evaluated.  E - Evolving. A mole or skin lesion that looks different from the rest or is changing in size, shape or color.     For basal cell carcinoma and squamous cell carcinoma, check for:    Sores, shiny bumps, nodules, scaly lesions, or wart-like growths that are itchy, tender, crusting, scabbing, eroding, oozing or bleeding.    Open sores/wounds or reddish/irritated areas that do not heal within 2-3 weeks.    Scar-like areas that are white, yellow or waxy in color.     The patient was counseled about sunscreens and sun avoidance. The patient was counseled to check the skin regularly and report any lesion that is new, changing, itching, scabbing, bleeding or otherwise bothersome. The patient was discharged ambulatory and in stable condition.     Recommendations : q1 year skin exams.        [facility]                        To reach your care team during and after hours:   [phone]  To reach our pharmacy:        [phone]    Clinic Hours                         Our clinic hours are:    Monday   7:30 am to 7:00 pm                  Tuesday through Friday 7:30 am to 5:00 pm                             Saturday   8:00 am to 12:00 pm      Sunday   Closed      Pharmacy Hours                        Our pharmacy hours are:    Monday   8:30 am to 7:00 pm       Tuesday to Friday  8:30 am to 6:00 pm                       Saturday    9:00 am to 1:00 pm              Sunday    Closed              There is also information available at our web site:  [email or web address]    If your provider ordered any lab tests and you do not receive the results within 10 business days, please call the clinic.    If you need a medication refill please contact your pharmacy.  Please allow 2-3 business days for your refill to be completed.    Our clinic offers telephone visits and e visits.  Please ask one of your team members to explain more.      Use [employer] (secure email communication and access to your chart) to send your primary care provider a message or make an appointment. Ask someone on your Team how to sign up for [employer].  Immunizations                      Immunization History   Administered Date(s) Administered     Influenza (High Dose) 3 valent vaccine 09/07/2017     Influenza (IIV3) PF 11/25/2003, 10/17/2006, 10/01/2009, 09/22/2010, 09/09/2011, 09/19/2012     Influenza Vaccine IM 3yrs+ 4 Valent IIV4 09/18/2013, 09/18/2014, 10/05/2015     Pneumo Conj 13-V (2010&after) 12/21/2016     Pneumococcal 23 valent 05/02/2014     TD (ADULT, 7+) 10/17/2003     TDAP Vaccine (Boostrix) 05/02/2012     Twinrix A/B 10/17/2003, 11/25/2003, 04/16/2004     Typhoid IM 10/17/2003     Zoster vaccine, live 10/05/2015        Health Maintenance                       Health Maintenance Due   Topic Date Due     FALL RISK ASSESSMENT  12/21/2017     PSA Q1 YR  12/21/2017     WELLNESS VISIT Q1 YR  12/21/2017     FIT Q1 YR  12/23/2017                 Follow-ups after your visit        Who to contact     If you  "have questions or need follow up information about today's clinic visit or your schedule please contact [facility] directly at [phone].  Normal or non-critical lab and imaging results will be communicated to you by MyChart, letter or phone within 4 business days after the clinic has received the results. If you do not hear from us within 7 days, please contact the clinic through [facility] or phone. If you have a critical or abnormal lab result, we will notify you by phone as soon as possible.  Submit refill requests through [employer] or call your pharmacy and they will forward the refill request to us. Please allow 3 business days for your refill to be completed.          Additional Information About Your Visit        [facility] Information     [employer] lets you send messages to your doctor, view your test results, renew your prescriptions, schedule appointments and more. To sign up, go to [email or web address] . Click on \"Log in\" on the left side of the screen, which will take you to the Welcome page. Then click on \"Sign up Now\" on the right side of the page.     You will be asked to enter the access code listed below, as well as some personal information. Please follow the directions to create your username and password.     Your access code is: [email or web address]  Expires: 2018  1:12 PM     Your access code will  in 90 days. If you need help or a new code, please call your [place] clinic or [phone].        Care EveryWhere ID     This is your Care EveryWhere ID. This could be used by other organizations to access your [place] medical records  [id number]        Your Vitals Were     Pulse Temperature Height Pulse Oximetry BMI (Body Mass Index)       77 98.1  F (36.7  C) (Oral) 5' 10\" (1.778 m) 96% 25.54 kg/m2        Blood Pressure from Last 3 Encounters:   18 112/70   17 102/74   16 122/78    Weight from Last 3 Encounters:   18 178 lb (80.7 kg)   17 173 lb " (78.5 kg)   12/21/16 180 lb (81.6 kg)              We Performed the Following     BIOPSY SKIN/SUBQ/MUC MEM, SINGLE LESION     Surgical pathology exam        Primary Care Provider Office Phone # Fax #    Nitin Chopra [phone]       [address] [place] [id number]        Equal Access to Services     [facility] : Hadii aad ku hadasho Soomaali, waaxda luqadaha, qaybta kaalmada adeegyada, waxay idiin hayaan adeeg khcarolinesh layumin . So [facility] [phone].    ATENCIÓN: Si habla español, tiene a kohli disposición servicios gratuitos de asistencia lingüística. Llame al [phone].    We comply with applicable federal civil rights laws and [place] laws. We do not discriminate on the basis of race, color, national origin, age, disability, sex, sexual orientation, or gender identity.            Thank you!     Thank you for choosing [facility]  for your care. Our goal is always to provide you with excellent care. Hearing back from our patients is one way we can continue to improve our services. Please take a few minutes to complete the written survey that you may receive in the mail after your visit with us. Thank you!             Your Updated Medication List - Protect others around you: Learn how to safely use, store and throw away your medicines at www.disposemymeds.org.          This list is accurate as of 2/1/18  1:12 PM.  Always use your most recent med list.                   Brand Name Dispense Instructions for use Diagnosis    ascorbic acid 500 MG tablet    VITAMIN C    100 tablet    Take 1 tablet by mouth daily.        aspirin  MG EC tablet     90 tablet    Take 1 tablet (325 mg) by mouth daily        calcium carbonate 1250 MG tablet    OS-MICHELLE 500 mg [place]    3 MONTHS    unsure of mgs        fish oil-omega-3 fatty acids 1000 MG capsule      unsure of mgs        Garlic 100 MG Tabs      unsure of mgs        meclizine 25 MG tablet    ANTIVERT    30 tablet    Take 1  tablet (25 mg) by mouth every 6 hours as needed for dizziness    BPPV (benign paroxysmal positional vertigo), unspecified laterality       Saw [place] (Serenoa repens) 1000 MG Caps      unsure of mgs        Vitamin B-12 1000 MCG/ML Soln      po unsure of mgs

## 2018-02-05 LAB — COPATH REPORT: NORMAL

## 2018-02-06 ENCOUNTER — TELEPHONE (OUTPATIENT)
Dept: FAMILY MEDICINE | Facility: CLINIC | Age: 68
End: 2018-02-06

## 2018-02-06 NOTE — TELEPHONE ENCOUNTER
Pt returning our call. Please call him back on his cell [phone]. OK to leave detailed Message  Devora Farrar, Clinic Receptionist

## 2018-02-06 NOTE — TELEPHONE ENCOUNTER
Please see 02/01/2018 lab result note:   Pathology report is benign, appears to be benign thickening of the skin (lichenoid).   We advise:  Continue current cares.  Follow up if any recurrence.    Advised patient of results - Patient stated an understanding and agreed with plan.    Alaina Doss RN  [facility]

## 2018-04-19 ENCOUNTER — TRANSFERRED RECORDS (OUTPATIENT)
Dept: HEALTH INFORMATION MANAGEMENT | Facility: CLINIC | Age: 68
End: 2018-04-19

## 2018-04-22 ENCOUNTER — TRANSFERRED RECORDS (OUTPATIENT)
Dept: HEALTH INFORMATION MANAGEMENT | Facility: CLINIC | Age: 68
End: 2018-04-22

## 2018-05-10 ENCOUNTER — TRANSFERRED RECORDS (OUTPATIENT)
Dept: FAMILY MEDICINE | Facility: CLINIC | Age: 68
End: 2018-05-10

## 2018-08-05 ENCOUNTER — HOSPITAL ENCOUNTER (OUTPATIENT)
Facility: CLINIC | Age: 68
Setting detail: OBSERVATION
Discharge: HOME | End: 2018-08-06
Attending: EMERGENCY MEDICINE | Admitting: INTERNAL MEDICINE
Payer: MEDICARE

## 2018-08-05 ENCOUNTER — APPOINTMENT (OUTPATIENT)
Dept: CT IMAGING | Facility: CLINIC | Age: 68
End: 2018-08-05
Attending: EMERGENCY MEDICINE
Payer: MEDICARE

## 2018-08-05 DIAGNOSIS — S09.90XA CLOSED HEAD INJURY, INITIAL ENCOUNTER: ICD-10-CM

## 2018-08-05 DIAGNOSIS — R55 SYNCOPE AND COLLAPSE: ICD-10-CM

## 2018-08-05 DIAGNOSIS — I47.29 PAROXYSMAL VENTRICULAR TACHYCARDIA (H): ICD-10-CM

## 2018-08-05 LAB
ANION GAP SERPL CALCULATED.3IONS-SCNC: 6 MMOL/L (ref 3–14)
BASOPHILS # BLD AUTO: 0.1 10E9/L (ref 0–0.2)
BASOPHILS NFR BLD AUTO: 0.8 %
BUN SERPL-MCNC: 18 MG/DL (ref 7–30)
CALCIUM SERPL-MCNC: 8.4 MG/DL (ref 8.5–10.1)
CHLORIDE SERPL-SCNC: 106 MMOL/L (ref 94–109)
CO2 SERPL-SCNC: 28 MMOL/L (ref 20–32)
CREAT SERPL-MCNC: 1.15 MG/DL (ref 0.66–1.25)
DIFFERENTIAL METHOD BLD: NORMAL
EOSINOPHIL # BLD AUTO: 0.2 10E9/L (ref 0–0.7)
EOSINOPHIL NFR BLD AUTO: 2 %
ERYTHROCYTE [DISTWIDTH] IN BLOOD BY AUTOMATED COUNT: 12.6 % (ref 10–15)
GFR SERPL CREATININE-BSD FRML MDRD: 63 ML/MIN/1.7M2
GLUCOSE SERPL-MCNC: 114 MG/DL (ref 70–99)
HCT VFR BLD AUTO: 40.7 % (ref 40–53)
HGB BLD-MCNC: 13.5 G/DL (ref 13.3–17.7)
IMM GRANULOCYTES # BLD: 0 10E9/L (ref 0–0.4)
IMM GRANULOCYTES NFR BLD: 0.4 %
INR PPP: 1.07 (ref 0.86–1.14)
LYMPHOCYTES # BLD AUTO: 3.5 10E9/L (ref 0.8–5.3)
LYMPHOCYTES NFR BLD AUTO: 45.1 %
MAGNESIUM SERPL-MCNC: 2.1 MG/DL (ref 1.6–2.3)
MCH RBC QN AUTO: 29 PG (ref 26.5–33)
MCHC RBC AUTO-ENTMCNC: 33.2 G/DL (ref 31.5–36.5)
MCV RBC AUTO: 88 FL (ref 78–100)
MONOCYTES # BLD AUTO: 0.7 10E9/L (ref 0–1.3)
MONOCYTES NFR BLD AUTO: 8.6 %
NEUTROPHILS # BLD AUTO: 3.3 10E9/L (ref 1.6–8.3)
NEUTROPHILS NFR BLD AUTO: 43.1 %
NRBC # BLD AUTO: 0 10*3/UL
NRBC BLD AUTO-RTO: 0 /100
PLATELET # BLD AUTO: 242 10E9/L (ref 150–450)
POTASSIUM SERPL-SCNC: 3.7 MMOL/L (ref 3.4–5.3)
RBC # BLD AUTO: 4.65 10E12/L (ref 4.4–5.9)
SODIUM SERPL-SCNC: 140 MMOL/L (ref 133–144)
TROPONIN I BLD-MCNC: 0 UG/L (ref 0–0.1)
WBC # BLD AUTO: 7.7 10E9/L (ref 4–11)

## 2018-08-05 PROCEDURE — 85610 PROTHROMBIN TIME: CPT | Performed by: EMERGENCY MEDICINE

## 2018-08-05 PROCEDURE — 99285 EMERGENCY DEPT VISIT HI MDM: CPT | Mod: 25

## 2018-08-05 PROCEDURE — 93005 ELECTROCARDIOGRAM TRACING: CPT | Mod: 76

## 2018-08-05 PROCEDURE — 99220 ZZC INITIAL OBSERVATION CARE,LEVL III: CPT | Performed by: INTERNAL MEDICINE

## 2018-08-05 PROCEDURE — 85025 COMPLETE CBC W/AUTO DIFF WBC: CPT | Performed by: EMERGENCY MEDICINE

## 2018-08-05 PROCEDURE — 25000132 ZZH RX MED GY IP 250 OP 250 PS 637: Mod: GY | Performed by: INTERNAL MEDICINE

## 2018-08-05 PROCEDURE — 70450 CT HEAD/BRAIN W/O DYE: CPT

## 2018-08-05 PROCEDURE — A9270 NON-COVERED ITEM OR SERVICE: HCPCS | Mod: GY | Performed by: EMERGENCY MEDICINE

## 2018-08-05 PROCEDURE — 25000132 ZZH RX MED GY IP 250 OP 250 PS 637: Mod: GY | Performed by: EMERGENCY MEDICINE

## 2018-08-05 PROCEDURE — G0378 HOSPITAL OBSERVATION PER HR: HCPCS

## 2018-08-05 PROCEDURE — 83735 ASSAY OF MAGNESIUM: CPT | Performed by: EMERGENCY MEDICINE

## 2018-08-05 PROCEDURE — 93005 ELECTROCARDIOGRAM TRACING: CPT

## 2018-08-05 PROCEDURE — 80048 BASIC METABOLIC PNL TOTAL CA: CPT | Performed by: EMERGENCY MEDICINE

## 2018-08-05 PROCEDURE — 84484 ASSAY OF TROPONIN QUANT: CPT

## 2018-08-05 RX ORDER — ONDANSETRON 4 MG/1
4 TABLET, ORALLY DISINTEGRATING ORAL EVERY 6 HOURS PRN
Status: DISCONTINUED | OUTPATIENT
Start: 2018-08-05 | End: 2018-08-06 | Stop reason: HOSPADM

## 2018-08-05 RX ORDER — ACETAMINOPHEN 325 MG/1
650 TABLET ORAL EVERY 4 HOURS PRN
Status: DISCONTINUED | OUTPATIENT
Start: 2018-08-05 | End: 2018-08-06 | Stop reason: HOSPADM

## 2018-08-05 RX ORDER — ACETAMINOPHEN 650 MG/1
650 SUPPOSITORY RECTAL EVERY 4 HOURS PRN
Status: DISCONTINUED | OUTPATIENT
Start: 2018-08-05 | End: 2018-08-06 | Stop reason: HOSPADM

## 2018-08-05 RX ORDER — NALOXONE HYDROCHLORIDE 0.4 MG/ML
.1-.4 INJECTION, SOLUTION INTRAMUSCULAR; INTRAVENOUS; SUBCUTANEOUS
Status: DISCONTINUED | OUTPATIENT
Start: 2018-08-05 | End: 2018-08-06 | Stop reason: HOSPADM

## 2018-08-05 RX ORDER — SODIUM PHOSPHATE,MONO-DIBASIC 19G-7G/118
1 ENEMA (ML) RECTAL EVERY OTHER DAY
COMMUNITY

## 2018-08-05 RX ORDER — TAMSULOSIN HYDROCHLORIDE 0.4 MG/1
0.4 CAPSULE ORAL DAILY
Status: ON HOLD | COMMUNITY
End: 2018-08-06

## 2018-08-05 RX ORDER — CALCIUM CARBONATE 500(1250)
1 TABLET ORAL EVERY OTHER DAY
COMMUNITY

## 2018-08-05 RX ORDER — LIDOCAINE 40 MG/G
CREAM TOPICAL
Status: DISCONTINUED | OUTPATIENT
Start: 2018-08-05 | End: 2018-08-06

## 2018-08-05 RX ORDER — ONDANSETRON 2 MG/ML
4 INJECTION INTRAMUSCULAR; INTRAVENOUS EVERY 6 HOURS PRN
Status: DISCONTINUED | OUTPATIENT
Start: 2018-08-05 | End: 2018-08-06 | Stop reason: HOSPADM

## 2018-08-05 RX ORDER — LANOLIN ALCOHOL/MO/W.PET/CERES
1000 CREAM (GRAM) TOPICAL EVERY OTHER DAY
COMMUNITY

## 2018-08-05 RX ORDER — ACETAMINOPHEN 325 MG/1
325 TABLET ORAL ONCE
Status: COMPLETED | OUTPATIENT
Start: 2018-08-05 | End: 2018-08-05

## 2018-08-05 RX ADMIN — DEXTRAN 70 AND HYPROMELLOSE 2910 2 DROP: 1; 3 SOLUTION/ DROPS OPHTHALMIC at 21:32

## 2018-08-05 RX ADMIN — ACETAMINOPHEN 325 MG: 325 TABLET, FILM COATED ORAL at 18:28

## 2018-08-05 ASSESSMENT — ENCOUNTER SYMPTOMS
DIARRHEA: 0
NECK PAIN: 0
LIGHT-HEADEDNESS: 1
NAUSEA: 0
DIZZINESS: 1
VOMITING: 0
COUGH: 0
FACIAL ASYMMETRY: 0

## 2018-08-05 NOTE — ED TRIAGE NOTES
67-year-old male presents to the ER with complaints of having a syncope episode onto carpet. Pt did his the right eye on a table. Pt was completely unresponsive when EMS arrived. Pt states lately he has been more dizzy when he stands up.

## 2018-08-05 NOTE — ED NOTES
Bed: ED32  Expected date: 8/5/18  Expected time: 3:47 PM  Means of arrival: Ambulance  Comments:  Kirstin Blake

## 2018-08-05 NOTE — ED NOTES
[facility]  ED Nurse Handoff Report    Ernie Landeros is a 67 year old male   ED Chief complaint: Loss of Consciousness  . ED Diagnosis:   Final diagnoses:   Syncope and collapse   Paroxysmal ventricular tachycardia (H)   Closed head injury, initial encounter     Allergies: No Known Allergies    Code Status: Full Code  Activity level - Baseline/Home:  Independent. Activity Level - Current:   Independent. Lift room needed: No. Bariatric: No   Needed: No   Isolation: No. Infection: Not Applicable.     Vital Signs:   Vitals:    08/05/18 1630 08/05/18 1700 08/05/18 1715 08/05/18 1730   BP: 157/72 128/76 113/78 129/80   Pulse:       Resp: 14 16 10 14   Temp:       TempSrc:       SpO2: (!) 83% 97% 99% 97%   Weight:           Cardiac Rhythm:  ,      Pain level: 0-10 Pain Scale: 4  Patient confused: No. Patient Falls Risk: Yes.   Elimination Status: Has voided   Patient Report - Initial Complaint: syncope at home. Focused Assessment: abrasion to right eye, several runs of vtach while at ed.   Tests Performed: labs, ct. Abnormal Results:   Labs Ordered and Resulted from Time of ED Arrival Up to the Time of Departure from the ED   BASIC METABOLIC PANEL - Abnormal; Notable for the following:        Result Value    Glucose 114 (*)     Calcium 8.4 (*)     All other components within normal limits   CBC WITH PLATELETS DIFFERENTIAL   INR   VITAL SIGNS   PULSE OXIMETRY NURSING   CARDIAC CONTINUOUS MONITORING   ORTHOSTATIC BLOOD PRESSURE AND PULSE   ISTAT TROPONIN NURSING POCT   TROPONIN POCT     .   Treatments provided: fluids  Family Comments: present  OBS brochure/video discussed/provided to patient:  Yes  ED Medications: Medications - No data to display  Drips infusing:  No  For the majority of the shift, the patient's behavior Green. Interventions performed were fluids.     Severe Sepsis OR Septic Shock Diagnosis Present: No      ED Nurse Name/Phone Number: Pete Maricruz,   6:02 PM  RECEIVING  UNIT ED HANDOFF REVIEW    Above ED Nurse Handoff Report was reviewed: Yes  Reviewed by: NOEL WORLEY on August 5, 2018 at 6:57 PM

## 2018-08-05 NOTE — IP AVS SNAPSHOT
MRN:[id number]                      After Visit Summary   8/5/2018    Ernie Landeros    MRN: [id number]           Thank you!     Thank you for choosing [facility] for your care. Our goal is always to provide you with excellent care. Hearing back from our patients is one way we can continue to improve our services. Please take a few minutes to complete the written survey that you may receive in the mail after you visit. If you would like to speak to someone directly about your visit please contact Patient Relations at [phone]. Thank you!          Patient Information     Date Of Birth          1950        About your hospital stay     You were admitted on:  August 5, 2018 You last received care in the:  [place] [address] [facility]    You were discharged on:  August 6, 2018        Reason for your hospital stay       You were admitted for episode of passing out or syncope. This is likely related to vasovagal syncope due to dehydration, plus possible side effects of Flomax. Continue to hydrate yourself and discontinue Flomax. Should you have persistent issues in the future with urination, talk to you PCP about trying alternative meds that does not cause Orthostatic hypotension. You were also seen by Cardiology who feels that your EKG rhythm is related to motion artifact and not anything rhythm disturbance. You are able to discharge today. Follow up with PCP in 1 week.                  Who to Call     For medical emergencies, please call 491.  For non-urgent questions about your medical care, please call your primary care provider or clinic, [phone]          Attending Provider     Provider Specialty    Isidro Hogan MD Emergency Medicine    [facility] Internal Medicine       Primary Care Provider Office Phone # Fax #    Nitin Chopra [phone]      After Care Instructions     Activity       Your activity upon discharge: activity as  "tolerated            Diet       Follow this diet upon discharge: Orders Placed This Encounter      Regular Diet Adult. Keep well hydrated and limit excessive caffeine intake.                  Follow-up Appointments     Follow-up and recommended labs and tests        Follow up with primary care provider, Nitin Chopra, within 7 days for hospital follow- up.  No follow up labs or test are needed.                  Pending Results     No orders found for last 3 day(s).            Statement of Approval     Ordered          18 1015  I have reviewed and agree with all the recommendations and orders detailed in this document.  EFFECTIVE NOW     Approved and electronically signed by:  Chelsey Aaron PA-C             Admission Information     Date & Time Provider Department Dept. Phone    2018 Brett Marshall MD [place] [address] [facility] [phone]      Your Vitals Were     Blood Pressure Pulse Temperature Respirations Height Weight    135/90 (BP Location: Left arm) 73 97  F (36.1  C) (Oral) 16 1.778 m (5' 10\") 79.1 kg (174 lb 4.8 oz)    Pulse Oximetry BMI (Body Mass Index)                95% 25.01 kg/m2          haku Information     haku lets you send messages to your doctor, view your test results, renew your prescriptions, schedule appointments and more. To sign up, go to [email or web address] . Click on \"Log in\" on the left side of the screen, which will take you to the Welcome page. Then click on \"Sign up Now\" on the right side of the page.     You will be asked to enter the access code listed below, as well as some personal information. Please follow the directions to create your username and password.     Your access code is: [email or web address]  Expires: 11/3/2018  5:37 PM     Your access code will  in 90 days. If you need help or a new code, please call your [place] clinic or [phone].        Care EveryWhere ID     This is your Care EveryWhere ID. This could be used by other " organizations to access your [facility] medical records  [id number]        Equal Access to Services     ROWAN ALMANZA : Hadii chance Springer, saurabh villalba, kristen ward. So [facility] [phone].    ATENCIÓN: Si habla español, tiene a kohli disposición servicios gratuitos de asistencia lingüística. Brijeshame al [phone].    We comply with applicable federal civil rights laws and [place] laws. We do not discriminate on the basis of race, color, national origin, age, disability, sex, sexual orientation, or gender identity.               Review of your medicines      CONTINUE these medicines which have NOT CHANGED        Dose / Directions    ascorbic acid 500 MG tablet   Commonly known as:  VITAMIN C        Dose:  500 mg   Take 500 mg by mouth every other day   Quantity:  100 tablet   Refills:  12       aspirin 325 MG EC tablet        Dose:  325 mg   Take 325 mg by mouth every other day   Quantity:  90 tablet   Refills:  3       calcium carbonate 500 MG tablet   Commonly known as:  OS-MICHELLE 500 mg [place]. Ca        Dose:  1 tablet   Take 1 tablet by mouth every other day   Refills:  0       cyanocobalamin 1000 MCG tablet   Commonly known as:  vitamin  B-12        Dose:  1000 mcg   Take 1,000 mcg by mouth every other day   Refills:  0       fish oil-omega-3 fatty acids 1000 MG capsule        1 tablet by mouth every other day   Refills:  0       Garlic 100 MG Tabs        1 tablet every other day   Refills:  0       glucosamine-chondroitin 500-400 MG Caps per capsule        Dose:  1 capsule   Take 1 capsule by mouth every other day   Refills:  0       TURMERIC PO        Dose:  1 tablet   Take 1 tablet by mouth every other day   Refills:  0         STOP taking     FLOMAX 0.4 MG capsule   Generic drug:  tamsulosin                    Protect others around you: Learn how to safely use, store and throw away your medicines at www.disposemymeds.org.             Medication  List: This is a list of all your medications and when to take them. Check marks below indicate your daily home schedule. Keep this list as a reference.      Medications           Morning Afternoon Evening Bedtime As Needed    ascorbic acid 500 MG tablet   Commonly known as:  VITAMIN C   Take 500 mg by mouth every other day                                aspirin 325 MG EC tablet   Take 325 mg by mouth every other day                                calcium carbonate 500 MG tablet   Commonly known as:  OS-MICHELLE 500 mg [place]. Ca   Take 1 tablet by mouth every other day                                cyanocobalamin 1000 MCG tablet   Commonly known as:  vitamin  B-12   Take 1,000 mcg by mouth every other day                                fish oil-omega-3 fatty acids 1000 MG capsule   1 tablet by mouth every other day                                Garlic 100 MG Tabs   1 tablet every other day                                glucosamine-chondroitin 500-400 MG Caps per capsule   Take 1 capsule by mouth every other day                                TURMERIC PO   Take 1 tablet by mouth every other day                                          More Information        Fainting: Vagal Reaction  Fainting (syncope) is a temporary loss of consciousness that is associated with a loss of postural tone. It s also called passing out. It occurs when blood flow to the brain is less than normal. Your healthcare provider believes that your fainting was because of a vagal reaction. This condition is not a sign of serious disease.  A vagal reaction is a response in your body that causes your pulse to slow down or the blood vessels to expand. This causes your blood pressure to fall. And this sends less blood to your brain if you are standing or sitting. That results in dizziness, near-fainting, or fainting. Lying down usually stops the reaction within 60 seconds.  This response can occur during sudden fear, severe pain, emotional stress,  overexertion, overheating, hunger, nausea or vomiting, prolonged standing, or standing up after sitting or lying for a long time.  Home care  Follow these guidelines when caring for yourself at home:    Rest today. Go back to your normal activities as soon as you are feeling back to normal.    Stay hydrated and avoid skipping meals.    If you feel lightheaded or dizzy, lie down right away. Or sit with your head lowered between your knees.  Follow-up care  Follow up with your healthcare provider, or as advised.  Call 911  Call 911 if any of the following occur:    Another fainting spell that s not explained by the common causes listed above    Pain in your chest, arm, neck, jaw, back, or abdomen    Shortness of breath    Severe headache or seizure    Your heart beats very rapidly, very slowly, or irregularly (palpitations)  Date Last Reviewed: 12/1/2016 2000-2017 [employer]. [address] [place] [address], [place] [address]. All rights reserved. This information is not intended as a substitute for professional medical care. Always follow your healthcare professional's instructions.

## 2018-08-05 NOTE — PHARMACY-ADMISSION MEDICATION HISTORY
Admission medication history interview status for this patient is complete. See [facility] admission navigator for allergy information, prior to admission medications and immunization status.     Medication history interview source(s):Patient and wife   Medication history resources (including written lists, pill bottles, clinic record):None  Primary pharmacy: [facility]    Changes made to PTA medication list:  Added: glucosamine-chondroitin, flomax, turmeric   Deleted: antivert, saw palmetto   Changed: added dose to all meds - taking all meds every other day except flomax (taking daily)     Actions taken by pharmacist (provider contacted, etc): Checked [facility] for current flomax dose as patient unsure.      Additional medication history information:None    Medication reconciliation/reorder completed by provider prior to medication history? No    Do you take OTC medications (eg tylenol, ibuprofen, fish oil, eye/ear drops, etc)? Y (Y/N)    For patients on insulin therapy: N (Y/N)    Prior to Admission medications    Medication Sig Last Dose Taking? Auth Provider   ascorbic acid (VITAMIN C) 500 MG tablet Take 500 mg by mouth every other day  8/4/2018 at pm Yes Nitin Chopra MD   aspirin  MG EC tablet Take 325 mg by mouth every other day  8/4/2018 at pm Yes Nitin Chopra MD   calcium carbonate (OS-MICHELLE 500 MG [place]. CA) 500 MG tablet Take 1 tablet by mouth every other day 8/4/2018 at pm Yes Unknown, Entered By History   cyanocobalamin (VITAMIN  B-12) 1000 MCG tablet Take 1,000 mcg by mouth every other day 8/4/2018 at pm Yes Unknown, Entered By History   FISH OIL 1000 MG OR CAPS 1 tablet by mouth every other day 8/4/2018 at pm Yes Nitin Chopra MD   GARLIC 100 MG OR TABS 1 tablet every other day 8/4/2018 at pm Yes Nitin Chopra MD   glucosamine-chondroitin 500-400 MG CAPS per capsule Take 1 capsule by mouth every other day 8/4/2018 at pm Yes Unknown, Entered By History   tamsulosin (FLOMAX) 0.4 MG capsule Take 0.4 mg  by mouth daily 8/5/2018 at am Yes Unknown, Entered By History   TURMERIC PO Take 1 tablet by mouth every other day 8/4/2018 at pm Yes Unknown, Entered By History

## 2018-08-05 NOTE — ED PROVIDER NOTES
History     Chief Complaint:  Loss of Consciousness    HPI   Ernie Landeros is a 67 year old male, with a history of BPH, who presents with his family  to the emergency department for evaluation of loss of consciousness. The patient reports he woke up normally and felt fine until he got up from a chair, took two steps, became dizzy and lost consciousness. The patient's wife reports he fell flat on his face and hit his head on an end table and looked pallor. He reports also feeling lightheaded and blurry vision on his right eye. He denies any slurred speech, facial droop, cough, chest pain, neck pain, or any other vision changes. Of note, he had a recent stress test at [facility] on 4/19/18.    Allergies:  No known drug allergies     Medications:    Vitamin C  Aspirin  Calcium  Fish oil  Antivert  Serenoa Repens  Vitamin B12  Flomax    Past Medical History:    GERD  BPH    Past Surgical History:    Tonsillectomy and adenoidectomy  Bilateral inguinal hernia repair  Left knee tibial plateau  Left carpal tunnel  Right carpal tunnel    Family History:    Arthritis  Parkinson's disease  HTN  Lipids    Social History:  Smoking status: Never  Alcohol use: No  The patient presents to the emergency department with his wife and family.  Marital Status:   [2]     Review of Systems   Eyes: Positive for visual disturbance.   Respiratory: Negative for cough.    Cardiovascular: Negative for chest pain.   Gastrointestinal: Negative for diarrhea, nausea and vomiting.   Musculoskeletal: Negative for neck pain.   Neurological: Positive for dizziness, syncope and light-headedness. Negative for facial asymmetry.   All other systems reviewed and are negative.    Physical Exam   Patient Vitals for the past 24 hrs:   BP Temp Temp src Pulse Heart Rate Resp SpO2 Weight   08/05/18 1815 143/81 - - - 53 11 96 % -   08/05/18 1800 129/75 - - - 54 12 94 % -   08/05/18 1745 136/77 - - - 58 24 95 % -   08/05/18 1730 129/80 - - - 66 14 97  % -   08/05/18 1715 113/78 - - - 62 10 99 % -   08/05/18 1700 128/76 - - - 57 16 97 % -   08/05/18 1630 157/72 - - - 79 14 (!) 83 % -   08/05/18 1615 148/81 - - - 68 13 (!) 83 % -   08/05/18 1607 - 98  F (36.7  C) - - - - - -   08/05/18 1557 146/86 - Oral 73 - 18 98 % 79.4 kg (175 lb)     Physical Exam  General: The patient is alert, in no respiratory distress.    HENT: Mucous membranes moist. Abrasion starting at right eye lateral canthus extending down the cheek. Small blood, no laceration. Fundoscopic right eye exam: normal with no hyphema.    Cardiovascular: Regular rate and rhythm. Good pulses in all four extremities. Normal capillary refill and skin turgor.     Respiratory: Lungs are clear. No nasal flaring. No retractions. No wheezing, no crackles.    Gastrointestinal: Abdomen soft. No guarding, no rebound. No palpable hernias.     Musculoskeletal: No gross deformity. Had c-collar in place. No C or T spinal tenderness.    Skin: No rashes or petechiae.     Neurologic: The patient is alert and oriented x3. GCS 15. No testable cranial nerve deficit. Follows commands with clear and appropriate speech. Gives appropriate answers. Good strength in all extremities. No gross neurologic deficit. Gross sensation intact. Pupils are round and reactive. No meningismus.     Lymphatic: No cervical adenopathy. No lower extremity swelling.    Psychiatric: The patient is non-tearful.    Emergency Department Course   ECG #1 (16:00:28):  Rate 72 bpm. MT interval 188. QRS duration 110. QT/QTc 400/438. P-R-T axes 44 10 9. Normal sinus rhythm. Nonspecific T wave abnormality-old. No significant change when compared to EKG dated 4/19/18. Interpreted at 1603 by Isidro Hogan MD.    ECG #2 (17:19:09):  Rate 57 bpm. MT interval 190. QRS duration 108. QT/QTc 418/406. P-R-T axes 40 2 2. Sinus bradycardia. Otherwise normal ECG. Interpreted at 1727 by Isidro Hogan MD.    Imaging:  Radiographic findings were communicated  with the patient and family who voiced understanding of the findings.    CT Head w/o Contrast  IMPRESSION: Diffuse cerebral volume loss and cerebral white matter  changes consistent with chronic small vessel ischemic disease. No  evidence for acute intracranial pathology.  As read by Radiology.    Laboratory:  CBC: WNL (WBC 7.7, HGB 13.5, )  BMP: Glucose 114 (H), Calcium 8.4 (L) o/w WNL (Creatinine 1.15)  INR: 1.07  Troponin POCT (1629): 0.00    Interventions:  1828 Tylenol 325 mg PO    Emergency Department Course:  Patient arrived via EMS.    Past medical records, nursing notes, and vitals reviewed.  1606: I performed an exam of the patient and obtained history, as documented above.    Discussed facial bone CT with patient and family and decided to hold off.    Had a c-collar in place that I clinically cleared.    IV inserted and blood drawn.    The patient was sent for a head CT while in the emergency department, findings above.    1702: Patient had runs of wide complex tachycardia.    1722: I discussed the patient with Dr. Gilmore of cardiology.    1757: I discussed the patient with the admitting hospitalist, Dr. Marshall    1813: I rechecked the patient. I discussed the findings with the patient and his family.    Findings and plan explained to the Patient and spouse and daughter who consents to admission.     Discussed the patient with Dr. Marshall, who will admit the patient to a cardiac telemetry bed for further monitoring, evaluation, and treatment.   Impression & Plan    Medical Decision Making:  The patient is quite healthy and recently, in April, started Flomax, which may be contributing to his lightheadedness, during an executive physical at [place] when did have a normal stress test and work up at that time. The patient said that he had similar symptoms to his usual orthostatic hypotension when he stood today, but got much more severe, he had to hold onto something and then fell onto his face. I did  discuss that his neck is not tender and I can clear him by nexus criteria. We did image his head due to him having hit his right face on the table. There is no signs of bony instability. He did not want a facial bone CT. The head CT was normal, but the patient, while here, had an episode of paroxymal v tech, however he was a symptomatic during it. I did discuss with cardiology, who did not want him to see EP acutely. His pressure is maintained here. Repeat EKG looked normal and unchanged and he is admitted to the hospital for echo, cardiology evaluation and potential EP studies, as well as monitoring rhythm. He is admitted to a cardiac telemetry bed.    Diagnosis:    ICD-10-CM   1. Syncope and collapse R55   2. Paroxysmal ventricular tachycardia (H) I47.2   3. Closed head injury, initial encounter S09.90XA     Disposition:  Admitted to cardiac telemetry.  Carol Staples  8/5/2018   [facility] EMERGENCY DEPARTMENT  Scribe Disclosure:  I, Carol Staples, am serving as a scribe at 4:06 PM on 8/5/2018 to document services personally performed by Isidro Hogan MD based on my observations and the provider's statements to me.      Isidro Hogan MD  08/05/18 [id number]

## 2018-08-06 ENCOUNTER — TELEPHONE (OUTPATIENT)
Dept: FAMILY MEDICINE | Facility: CLINIC | Age: 68
End: 2018-08-06

## 2018-08-06 ENCOUNTER — APPOINTMENT (OUTPATIENT)
Dept: CARDIOLOGY | Facility: CLINIC | Age: 68
End: 2018-08-06
Attending: INTERNAL MEDICINE
Payer: MEDICARE

## 2018-08-06 VITALS
DIASTOLIC BLOOD PRESSURE: 90 MMHG | SYSTOLIC BLOOD PRESSURE: 135 MMHG | WEIGHT: 174.3 LBS | HEART RATE: 73 BPM | HEIGHT: 70 IN | OXYGEN SATURATION: 95 % | TEMPERATURE: 97 F | RESPIRATION RATE: 16 BRPM | BODY MASS INDEX: 24.95 KG/M2

## 2018-08-06 PROBLEM — R55 SYNCOPE AND COLLAPSE: Status: RESOLVED | Noted: 2018-08-05 | Resolved: 2018-08-06

## 2018-08-06 LAB
ALBUMIN SERPL-MCNC: 3.6 G/DL (ref 3.4–5)
ALP SERPL-CCNC: 54 U/L (ref 40–150)
ALT SERPL W P-5'-P-CCNC: 20 U/L (ref 0–70)
ANION GAP SERPL CALCULATED.3IONS-SCNC: 1 MMOL/L (ref 3–14)
AST SERPL W P-5'-P-CCNC: 27 U/L (ref 0–45)
BILIRUB SERPL-MCNC: 1 MG/DL (ref 0.2–1.3)
BUN SERPL-MCNC: 17 MG/DL (ref 7–30)
CALCIUM SERPL-MCNC: 8.7 MG/DL (ref 8.5–10.1)
CHLORIDE SERPL-SCNC: 108 MMOL/L (ref 94–109)
CO2 SERPL-SCNC: 30 MMOL/L (ref 20–32)
CREAT SERPL-MCNC: 1.11 MG/DL (ref 0.66–1.25)
GFR SERPL CREATININE-BSD FRML MDRD: 66 ML/MIN/1.7M2
GLUCOSE SERPL-MCNC: 99 MG/DL (ref 70–99)
INTERPRETATION ECG - MUSE: NORMAL
INTERPRETATION ECG - MUSE: NORMAL
POTASSIUM SERPL-SCNC: 4.5 MMOL/L (ref 3.4–5.3)
PROT SERPL-MCNC: 6.8 G/DL (ref 6.8–8.8)
SODIUM SERPL-SCNC: 139 MMOL/L (ref 133–144)
TROPONIN I SERPL-MCNC: <0.015 UG/L (ref 0–0.04)

## 2018-08-06 PROCEDURE — 99214 OFFICE O/P EST MOD 30 MIN: CPT | Performed by: INTERNAL MEDICINE

## 2018-08-06 PROCEDURE — 25000132 ZZH RX MED GY IP 250 OP 250 PS 637: Mod: GY | Performed by: INTERNAL MEDICINE

## 2018-08-06 PROCEDURE — 80053 COMPREHEN METABOLIC PANEL: CPT | Performed by: INTERNAL MEDICINE

## 2018-08-06 PROCEDURE — G0378 HOSPITAL OBSERVATION PER HR: HCPCS

## 2018-08-06 PROCEDURE — 36415 COLL VENOUS BLD VENIPUNCTURE: CPT | Performed by: INTERNAL MEDICINE

## 2018-08-06 PROCEDURE — 99217 ZZC OBSERVATION CARE DISCHARGE: CPT | Performed by: PHYSICIAN ASSISTANT

## 2018-08-06 PROCEDURE — A9270 NON-COVERED ITEM OR SERVICE: HCPCS | Mod: GY | Performed by: INTERNAL MEDICINE

## 2018-08-06 PROCEDURE — 84484 ASSAY OF TROPONIN QUANT: CPT | Performed by: INTERNAL MEDICINE

## 2018-08-06 RX ADMIN — ACETAMINOPHEN 650 MG: 325 TABLET, FILM COATED ORAL at 08:44

## 2018-08-06 RX ADMIN — ACETAMINOPHEN 650 MG: 325 TABLET, FILM COATED ORAL at 00:48

## 2018-08-06 RX ADMIN — DEXTRAN 70 AND HYPROMELLOSE 2910 3 DROP: 1; 3 SOLUTION/ DROPS OPHTHALMIC at 04:01

## 2018-08-06 RX ADMIN — ACETAMINOPHEN 650 MG: 325 TABLET, FILM COATED ORAL at 04:03

## 2018-08-06 RX ADMIN — DEXTRAN 70 AND HYPROMELLOSE 2910 3 DROP: 1; 3 SOLUTION/ DROPS OPHTHALMIC at 00:53

## 2018-08-06 NOTE — PLAN OF CARE
PRIMARY DIAGNOSIS: SYNCOPE  OUTPATIENT/OBSERVATION GOALS TO BE MET BEFORE DISCHARGE:  1. Orthostatic performed: No    2. Diagnostic testing complete & at baseline neurologic testing: No    3. Cleared by consultants (if involved): No    4. Interpretation of cardiac rhythm per telemetry tech: Sinus bradycardia    5. Tolerating adequate PO diet and medications: Yes    6. Return to near baseline physical activity or neurologic status: Yes    Discharge Planner Nurse   Safe discharge environment identified: Yes  Barriers to discharge: No       Entered by: Sarahy Craven 08/06/2018 4:34 AM     Please review provider order for any additional goals.   Nurse to notify provider when observation goals have been met and patient is ready for discharge.

## 2018-08-06 NOTE — TELEPHONE ENCOUNTER
Wife- Ariana calling to update clinic that Enrique is currently admitted at [facility]. Still at the hospital and will be having testing done. Please call her with any questions or concerns    Ariana# [phone]  Ok to leave detailed message: yes    Thank you  Una Montoya

## 2018-08-06 NOTE — CONSULTS
"[facility]    Cardiology Consultation     Date of Admission:  8/5/2018    Primary Care Physician   Nitin Chopra     Consult Date:  08/06/2018        REASON FOR CONSULTATION:  Syncope.      REFERRING PHYSICIAN:  Brett Marshall MD       IMPRESSION:   1.  Vasovagal syncope.   2.  Motion artifact simulating tachycardia.   3.  Benign prostatic hypertrophy, on Flomax.      This very pleasant 67-year-old gentleman with no serious past medical history gives a very typical story for vasovagal syncope.  We discussed its etiology and potential avoidance maneuvers.  He has had dizzy spells for years which resolve when he stands up.  I told him that in future when he feels dizzy, he should not stand up as this will worsen the symptom.  He should lie down.  We also discussed the importance of keeping well hydrated, avoidance of heavy alcohol intake as well as potentially using compression stockings.      I personally evaluated the rhythm strip which led to his admission.  They are clearly visible R waves thoroughout the period of \"arrhythmia\".  It is almost certainly a motion artifact.  He was asymptomatic when this occurred.  I do not think further cardiac workup is necessary at this time.  He tells me that he read the label on the Flomax and one of possible side effects is dizzy spells.  As he says that this medication has not made much difference to his urinary symptoms which are in any case mild, I think he can stop taking this medication to see if dizziness improves.     From my point of view, he may be discharged.        HISTORY OF PRESENT ILLNESS:  A very pleasant gentleman with no previous cardiac history.  He is 67 years old and is physically active.  He cycles and runs and denies exertional chest discomfort, shortness of breath or any heart failure symptoms.  He does say that he has been feeling dizzy for the past few years.  This only lasts about 5 seconds.  It usually occurs when he is sitting, but when " he stands up such as when he has to sing in [employer], the dizziness would resolve after a few seconds.      He does not drink alcohol on a regular basis and does not smoke.  He was at a family dinner.  He felt dizzy and stood up and then he lost consciousness.  His granddaughter who was present said he looked very pale.  He must have lost consciousness for 15-20 minutes.  He bruised his right eye.  Otherwise, no injuries.  This is the first time he has had a syncopal episode.        Past Medical History   I have reviewed this patient's medical history and updated it with pertinent information if needed.   Past Medical History:   Diagnosis Date     BPH (benign prostatic hyperplasia)      CARDIOVASCULAR SCREENING; LDL GOAL LESS THAN 130      GERD (gastroesophageal reflux disease)        Past Surgical History   I have reviewed this patient's surgical history and updated it with pertinent information if needed.  Past Surgical History:   Procedure Laterality Date     HC COLONOSCOPY THRU STOMA, DIAGNOSTIC  ,     normal - Dr Valente - due      HC REMOVE TONSILS/ADENOIDS,<11 Y/O      T & A <13y.o.     HC REPAIR ING HERNIA,5+Y/O,REDUCIBL      bilateral inguinal hernia     STRESS TEST  2018    normal - [facility]     SURGICAL HISTORY OF -       Lt Knee tibial plateua fx - dr morales     SURGICAL HISTORY OF -       Lt carpal tunnel - dr morales     SURGICAL HISTORY OF -       Rt carpal tunnel - dr morales       Prior to Admission Medications   Prior to Admission Medications   Prescriptions Last Dose Informant Patient Reported? Taking?   FISH OIL 1000 MG OR CAPS 2018 at pm  Yes Yes   Si tablet by mouth every other day   GARLIC 100 MG OR TABS 2018 at pm  Yes Yes   Si tablet every other day   TURMERIC PO 2018 at pm  Yes Yes   Sig: Take 1 tablet by mouth every other day   ascorbic acid (VITAMIN C) 500 MG tablet 2018 at pm  Yes Yes   Sig: Take 500 mg by mouth every other day     aspirin  MG EC tablet 8/4/2018 at pm  Yes Yes   Sig: Take 325 mg by mouth every other day    calcium carbonate (OS-MICHELLE 500 MG [place]. [place]) 500 MG tablet 8/4/2018 at pm  Yes Yes   Sig: Take 1 tablet by mouth every other day   cyanocobalamin (VITAMIN  B-12) 1000 MCG tablet 8/4/2018 at pm  Yes Yes   Sig: Take 1,000 mcg by mouth every other day   glucosamine-chondroitin 500-400 MG CAPS per capsule 8/4/2018 at pm  Yes Yes   Sig: Take 1 capsule by mouth every other day   tamsulosin (FLOMAX) 0.4 MG capsule 8/5/2018 at am  Yes No   Sig: Take 0.4 mg by mouth daily      Facility-Administered Medications: None     Allergies   No Known Allergies    Social History   I have reviewed this patient's social history and updated it with pertinent information if needed. Ernie MALDONADO Tigre  reports that he has never smoked. He has never used smokeless tobacco. He reports that he does not drink alcohol or use illicit drugs.    Family History   I have reviewed this patient's family history and updated it with pertinent information if needed.   Family History   Problem Relation Age of Onset     Arthritis Mother      Neurologic Disorder Father      Parkinsons     Hypertension Father      Hypertension Brother      Lipids Brother      HEART DISEASE Maternal Grandfather        Review of Systems   The 10 point Review of Systems is negative other than noted in the HPI or here.     Physical Exam   Temp: 97  F (36.1  C) Temp src: Oral BP: 135/90 Pulse: 73 Heart Rate: 68 Resp: 16 SpO2: 95 % O2 Device: None (Room air)    Vital Signs with Ranges  Temp:  [96.3  F (35.7  C)-98  F (36.7  C)] 97  F (36.1  C)  Pulse:  [73] 73  Heart Rate:  [53-79] 68  Resp:  [10-24] 16  BP: (113-158)/(69-90) 135/90  SpO2:  [83 %-99 %] 95 %  174 lbs 4.8 oz    Data   Results for orders placed or performed during the hospital encounter of 08/05/18 (from the past 24 hour(s))   EKG 12 lead   Result Value Ref Range    Interpretation ECG Click View Image link to view  waveform and result    CBC with platelets differential   Result Value Ref Range    WBC 7.7 4.0 - 11.0 10e9/L    RBC Count 4.65 4.4 - 5.9 10e12/L    Hemoglobin 13.5 13.3 - 17.7 g/dL    Hematocrit 40.7 40.0 - 53.0 %    MCV 88 78 - 100 fl    MCH 29.0 26.5 - 33.0 pg    MCHC 33.2 31.5 - 36.5 g/dL    RDW 12.6 10.0 - 15.0 %    Platelet Count 242 150 - 450 10e9/L    Diff Method Automated Method     % Neutrophils 43.1 %    % Lymphocytes 45.1 %    % Monocytes 8.6 %    % Eosinophils 2.0 %    % Basophils 0.8 %    % Immature Granulocytes 0.4 %    Nucleated RBCs 0 0 /100    Absolute Neutrophil 3.3 1.6 - 8.3 10e9/L    Absolute Lymphocytes 3.5 0.8 - 5.3 10e9/L    Absolute Monocytes 0.7 0.0 - 1.3 10e9/L    Absolute Eosinophils 0.2 0.0 - 0.7 10e9/L    Absolute Basophils 0.1 0.0 - 0.2 10e9/L    Abs Immature Granulocytes 0.0 0 - 0.4 10e9/L    Absolute Nucleated RBC 0.0    INR   Result Value Ref Range    INR 1.07 0.86 - 1.14   Basic metabolic panel   Result Value Ref Range    Sodium 140 133 - 144 mmol/L    Potassium 3.7 3.4 - 5.3 mmol/L    Chloride 106 94 - 109 mmol/L    Carbon Dioxide 28 20 - 32 mmol/L    Anion Gap 6 3 - 14 mmol/L    Glucose 114 (H) 70 - 99 mg/dL    Urea Nitrogen 18 7 - 30 mg/dL    Creatinine 1.15 0.66 - 1.25 mg/dL    GFR Estimate 63 >60 mL/min/1.7m2    GFR Estimate If Black 77 >60 mL/min/1.7m2    Calcium 8.4 (L) 8.5 - 10.1 mg/dL   Magnesium   Result Value Ref Range    Magnesium 2.1 1.6 - 2.3 mg/dL   Troponin POCT   Result Value Ref Range    Troponin I 0.00 0.00 - 0.10 ug/L   CT Head w/o Contrast    Narrative    CT OF THE HEAD WITHOUT CONTRAST 8/5/2018 4:48 PM     COMPARISON: None.    HISTORY: Syncope, facial trauma.      TECHNIQUE: 5 mm thick axial CT images of the head were acquired  without IV contrast material.    FINDINGS: There is moderate diffuse cerebral volume loss. There are  subtle patchy areas of decreased density in the cerebral white matter  bilaterally that are consistent with sequela of chronic small  vessel  ischemic disease.    The ventricles and basal cisterns are within normal limits in  configuration given the degree of cerebral volume loss.  There is no  midline shift. There are no extra-axial fluid collections.    No intracranial hemorrhage, mass or recent infarct.    The visualized paranasal sinuses are well-aerated. There is no  mastoiditis. There are no fractures of the visualized bones.      Impression    IMPRESSION: Diffuse cerebral volume loss and cerebral white matter  changes consistent with chronic small vessel ischemic disease. No  evidence for acute intracranial pathology.    Radiation dose for this scan was reduced using automated exposure  control, adjustment of the mA and/or kV according to patient size, or  iterative reconstruction technique.      RANGEL GARCIA MD   EKG 12 lead   Result Value Ref Range    Interpretation ECG Click View Image link to view waveform and result    Comprehensive metabolic panel   Result Value Ref Range    Sodium 139 133 - 144 mmol/L    Potassium 4.5 3.4 - 5.3 mmol/L    Chloride 108 94 - 109 mmol/L    Carbon Dioxide 30 20 - 32 mmol/L    Anion Gap 1 (L) 3 - 14 mmol/L    Glucose 99 70 - 99 mg/dL    Urea Nitrogen 17 7 - 30 mg/dL    Creatinine 1.11 0.66 - 1.25 mg/dL    GFR Estimate 66 >60 mL/min/1.7m2    GFR Estimate If Black 80 >60 mL/min/1.7m2    Calcium 8.7 8.5 - 10.1 mg/dL    Bilirubin Total 1.0 0.2 - 1.3 mg/dL    Albumin 3.6 3.4 - 5.0 g/dL    Protein Total 6.8 6.8 - 8.8 g/dL    Alkaline Phosphatase 54 40 - 150 U/L    ALT 20 0 - 70 U/L    AST 27 0 - 45 U/L   Troponin I   Result Value Ref Range    Troponin I ES <0.015 0.000 - 0.045 ug/L           PHYSICAL EXAMINATION:   GENERAL:  A very pleasant gentleman in no acute distress.   VITAL SIGNS:  Blood pressure 135/90.  He is afebrile.  Heart rate is normal.   HEENT:  Examination is unremarkable.  Mucous membranes appear normal.  He has no clubbing, no peripheral or central cyanosis.   NECK:  Examination revealed no  raised JVP and no thyromegaly.   CARDIAC:  [place] is not palpable.  Heart sounds are normal.   CHEST:  Symmetrical expansion without the use of accessory muscles.  Breath sounds are normal.   ABDOMEN:  Soft and nontender.  No hepatosplenomegaly.  The abdominal aorta is not palpable.   VASCULAR:  No significant peripheral edema.  Foot pulses are preserved and intact.   CENTRAL NERVOUS SYSTEM:  Examination grossly intact.   PSYCHIATRIC:  Mood appears normal.      LABORATORY INVESTIGATIONS:  EKG shows sinus rhythm with nonspecific ST-segment changes which are mild.  It is probably within normal limits.  Rhythm strip is as mentioned above.      Lab work is essentially unremarkable.        A CT scan of the brain shows diffuse cerebral volume loss, but no evidence of acute injury.         KAYLEIGH MORSE MD, [facility]             D: 2018   T: 2018   MT: TIMBO      Name:     BARRON YANG   MRN:      [id number]        Account:       [id number]   :      1950           Consult Date:  2018      Document: [id number]       cc: Brett Chopra MD

## 2018-08-06 NOTE — PROGRESS NOTES
DC instructions given to pt, verbalized understanding.  All belongings with pt, IV DC'd and documented.     Pt walked off unit. Son is picking him up in the lobby

## 2018-08-06 NOTE — DISCHARGE SUMMARY
[facility]  Discharge Summary        Ernie Landeros MRN# [id number]   YOB: 1950 Age: 67 year old     Date of Admission:  8/5/2018  Date of Discharge:  8/6/2018  Admitting Physician:  Brett Marshall MD  Discharge Physician: Chelsey Aaron PA-C  Discharging Service: Hospitalist      Primary Provider: Nitin Chopra  Primary Care Physician Phone Number: [phone]         Primary Discharge Diagnoses:    Ernie Landeros was admitted on 8/5/2018 for episode of syncope.     1. Syncopal Episode: Suspect vaso-vagal in nature, compounded by likely orthostatic hypotension due to initiation of Flomax     2. Rhythm disturbance seen on EKG lead: motion artifact. Not significant.       Secondary Discharge Diagnoses:     Past Medical History:   Diagnosis Date     BPH (benign prostatic hyperplasia)      CARDIOVASCULAR SCREENING; LDL GOAL LESS THAN 130      GERD (gastroesophageal reflux disease)             Code Status:      Full Code        Brief Hospital Summary:        Reason for your hospital stay       You were admitted for episode of passing out or syncope. This is likely   related to vasovagal syncope due to dehydration, plus possible side   effects of Flomax. Continue to hydrate yourself and discontinue Flomax.   Should you have persistent issues in the future with urination, talk to   you PCP about trying alternative meds that does not cause Orthostatic   hypotension. You were also seen by Cardiology who feels that your EKG   rhythm is related to motion artifact and not anything rhythm disturbance.   You are able to discharge today. Follow up with PCP in 1 week.          Please refer to initial admission history and physical for further details.   Briefly, Ernie Landeros was admitted on 8/5/2018 for episode of syncope. He had a good day with family and had just finished desert around 3 PM when he got up from the dinner table.  He immediately recognized symptoms of  orthostatic dizziness which have been more common and more intense recently.  He held onto the wall but apparently the next thing he remembers is EMS all around him.     Family indicates the patient had been unconscious for more than a couple of minutes.  They describe classic pallor cool skin and drenching sweats.  Patient has not had any other episodes similar to this but notes that recently he has noted an increase in the frequency and intensity of orthostatic symptoms.       Mr. Landeros exercises daily.  He describes riding a bike 3 times a week alternating with walking and weightlifting 3 times a week. He is a lifelong non-smoker nondrinker and has 1 cup of coffee a day.  He has never had any issues with heart disease or exercise intolerance.  The patient underwent an executive physical at [facility] and was started on Flomax for rather mild BPH symptoms on April 24.  Family history is negative for early aggressive coronary disease, as well as for sudden unexplained death. While he was in the emergency department he was noted to have some type of rhythm disturbance and for that reason I am asked to admit him for monitoring overnight.    He was admitted overnight, tele demonstrated NS, he was seen by Cards that felt confident that the abnormal rhythm noted in ED was due to motion artifact (pls see full consult). His episode of syncope likely was vaso-vagal and encourage increased hydration and cessation of Flomax. We did talk about his urinary frequency (which he states is not bad). He opted not to take anything and follow up with PCP as needed.            Significant Lab During Hospitalization:        Recent Labs  Lab 08/05/18  1609   WBC 7.7   HGB 13.5   HCT 40.7   MCV 88          Recent Labs  Lab 08/06/18  0706 08/05/18  1609    140   POTASSIUM 4.5 3.7   CHLORIDE 108 106   CO2 30 28   ANIONGAP 1* 6   GLC 99 114*   BUN 17 18   CR 1.11 1.15   GFRESTIMATED 66 63   GFRESTBLACK 80 77   MICHELLE 8.7 8.4*      No results for input(s): CULT in the last 168 hours.             Significant Imaging During Hospitalization:      Results for orders placed or performed during the hospital encounter of 08/05/18   CT Head w/o Contrast    Narrative    CT OF THE HEAD WITHOUT CONTRAST 8/5/2018 4:48 PM     COMPARISON: None.    HISTORY: Syncope, facial trauma.      TECHNIQUE: 5 mm thick axial CT images of the head were acquired  without IV contrast material.    FINDINGS: There is moderate diffuse cerebral volume loss. There are  subtle patchy areas of decreased density in the cerebral white matter  bilaterally that are consistent with sequela of chronic small vessel  ischemic disease.    The ventricles and basal cisterns are within normal limits in  configuration given the degree of cerebral volume loss.  There is no  midline shift. There are no extra-axial fluid collections.    No intracranial hemorrhage, mass or recent infarct.    The visualized paranasal sinuses are well-aerated. There is no  mastoiditis. There are no fractures of the visualized bones.      Impression    IMPRESSION: Diffuse cerebral volume loss and cerebral white matter  changes consistent with chronic small vessel ischemic disease. No  evidence for acute intracranial pathology.    Radiation dose for this scan was reduced using automated exposure  control, adjustment of the mA and/or kV according to patient size, or  iterative reconstruction technique.      RANGEL GARCIA MD              Pending Results:        Unresulted Labs Ordered in the Past 30 Days of this Admission     No orders found for last 61 day(s).              Consultations This Hospital Stay:      Consultation during this admission received from cardiology         Discharge Instructions and Follow-Up:      Follow up with primary care provider, Nitin Chopra, within 7 days for   hospital follow- up.  No follow up labs or test are needed.      `      Discharge Disposition:      Discharged to home          "Discharge Medications:        Current Discharge Medication List      CONTINUE these medications which have NOT CHANGED    Details   ascorbic acid (VITAMIN C) 500 MG tablet Take 500 mg by mouth every other day   Qty: 100 tablet, Refills: 12      aspirin  MG EC tablet Take 325 mg by mouth every other day   Qty: 90 tablet, Refills: 3      calcium carbonate (OS-MICHELLE 500 MG [place]. [place]) 500 MG tablet Take 1 tablet by mouth every other day      cyanocobalamin (VITAMIN  B-12) 1000 MCG tablet Take 1,000 mcg by mouth every other day      FISH OIL 1000 MG OR CAPS 1 tablet by mouth every other day  Refills: 0      GARLIC 100 MG OR TABS 1 tablet every other day  Refills: 0      glucosamine-chondroitin 500-400 MG CAPS per capsule Take 1 capsule by mouth every other day      TURMERIC PO Take 1 tablet by mouth every other day         STOP taking these medications       tamsulosin (FLOMAX) 0.4 MG capsule Comments:   Reason for Stopping:                   Allergies:       No Known Allergies        Condition and Physical on Discharge:      Discharge condition: Stable   Vitals: Blood pressure 135/90, pulse 73, temperature 97  F (36.1  C), temperature source Oral, resp. rate 16, height 1.778 m (5' 10\"), weight 79.1 kg (174 lb 4.8 oz), SpO2 95 %.  174 lbs 4.8 oz      GENERAL:  Comfortable.  PSYCH: pleasant, oriented, No acute distress.  HEENT:  PERRLA. Normal conjunctiva, normal hearing, nasal mucosa and Oropharynx are normal.  NECK:  Supple, no neck vein distention, adenopathy or bruits, normal thyroid.  HEART:  Normal S1, S2 with no murmur, no pericardial rub, gallops or S3 or S4.  LUNGS:  Clear to auscultation, normal Respiratory effort. No wheezing, rales or ronchi.  ABDOMEN:  Soft, no hepatosplenomegaly, normal bowel sounds. Non-tender, non distended.   EXTREMITIES:  No pedal edema, +2 pulses bilateral and equal.  SKIN:  Dry to touch, No rash, wound or ulcerations.  NEUROLOGIC:  CN 2-12 intact, BL 5/5 symmetric upper and lower " extremity strength, sensation is intact with no focal deficits.

## 2018-08-06 NOTE — H&P
[facility] History and Physical    Ernie Landeros MRN# [id number]   Age: 67 year old YOB: 1950     Date of Admission:  8/5/2018    Home clinic: [facility]  Primary care provider: Nitin Chopra          Assessment and Plan:   Assessment:   Ernie Landeros is a 67 year old man who came to attention this afternoon after an apparent syncopal event that sounds like classic vasovagal syncope.  While he was in the emergency department he was noted to have some type of rhythm disturbance and for that reason I am asked to admit him for monitoring overnight.    Diagnoses:  1.  Vasovagal syncope.  Probably exacerbated by recent initiation of Flomax.  2.  Rhythm disturbance.  I am almost certain that this is artifact on a single lead monitoring strip.  There are obvious QRS spikes that march out in a normal sinus pattern superimposed on a very rapid odd looking tracing.  The patient was completely asymptomatic through the duration of the apparent abnormal rhythm.  If it is been a conducting rhythm, his heart rate would have been about 250-300, which is not at all consistent with the lack of symptoms that is reported.  Finally the patient has no known substrate for significant rhythm disturbance.      Plan:   1.  Admit to observation on telemetry.  2.  Echocardiogram in the morning.  3.  Cardiology consultation.  4.  I discussed with the patient the possibility of discontinuing his Flomax as it seems to correlate with his symptoms as above.  It is possible that finasteride may be better tolerated but unfortunately a certain amount of orthostatic problems are related to that as well.             Chief Complaint:   Syncope with associated loss of consciousness, fall and apparent facial injury     History is obtained from the patient, his wife and daughter who were at the bedside, emergency room physician and the electronic medical record.    Mr. Landeros indicates that he has been  feeling fine.  He had a good day with family and had just finished desert around 3 PM when he got up from the dinner table.  He immediately recognized symptoms of orthostatic dizziness which have been more common and more intense recently.  He held onto the wall but apparently the next thing he remembers is EMS all around him.    Family indicates the patient had been unconscious for more than a couple of minutes.  They describe classic pallor cool skin and drenching sweats.  Patient has not had any other episodes similar to this but notes that recently he has noted an increase in the frequency and intensity of orthostatic symptoms.      Mr. Landeros exercises daily.  He describes riding a bike 3 times a week alternating with walking and weightlifting 3 times a week.  He is a lifelong non-smoker nondrinker and has 1 cup of coffee a day.  He has never had any issues with heart disease or exercise intolerance.  The patient underwent an executive physical at [facility] and was started on Flomax for rather mild BPH symptoms on April 24.  Family history is negative for early aggressive coronary disease, as well as for sudden unexplained death.        Past Medical History:     Past Medical History:   Diagnosis Date     BPH (benign prostatic hyperplasia)      CARDIOVASCULAR SCREENING; LDL GOAL LESS THAN 130      GERD (gastroesophageal reflux disease)              Past Surgical History:      Past Surgical History:   Procedure Laterality Date     HC COLONOSCOPY THRU STOMA, DIAGNOSTIC  12/04, 4/14    normal - Dr Valente - due 2024     HC REMOVE TONSILS/ADENOIDS,<11 Y/O  1958    T & A <13y.o.     HC REPAIR ING HERNIA,5+Y/O,REDUCIBL  1955    bilateral inguinal hernia     STRESS TEST  04/2018    normal - [place]     SURGICAL HISTORY OF -   8/05    Lt Knee tibial plateua fx - dr morales     SURGICAL HISTORY OF -   2009    Lt carpal tunnel - dr morales     SURGICAL HISTORY OF -   1/14    Rt carpal tunnel - dr morales              Social History:     Social History   Substance Use Topics     Smoking status: Never Smoker     Smokeless tobacco: Never Used     Alcohol use No             Family History:     Family History   Problem Relation Age of Onset     Arthritis Mother      Neurologic Disorder Father      Parkinsons     Hypertension Father      Hypertension Brother      Lipids Brother      HEART DISEASE Maternal Grandfather      Family history as noted above.         Allergies:   No Known Allergies          Medications:     Prescriptions Prior to Admission   Medication Sig Dispense Refill Last Dose     ascorbic acid (VITAMIN C) 500 MG tablet Take 500 mg by mouth every other day  100 tablet 12 8/4/2018 at pm     aspirin  MG EC tablet Take 325 mg by mouth every other day  90 tablet 3 8/4/2018 at pm     calcium carbonate (OS-MICHELLE 500 MG [place]. CA) 500 MG tablet Take 1 tablet by mouth every other day   8/4/2018 at pm     cyanocobalamin (VITAMIN  B-12) 1000 MCG tablet Take 1,000 mcg by mouth every other day   8/4/2018 at pm     FISH OIL 1000 MG OR CAPS 1 tablet by mouth every other day  0 8/4/2018 at pm     GARLIC 100 MG OR TABS 1 tablet every other day  0 8/4/2018 at pm     glucosamine-chondroitin 500-400 MG CAPS per capsule Take 1 capsule by mouth every other day   8/4/2018 at pm     tamsulosin (FLOMAX) 0.4 MG capsule Take 0.4 mg by mouth daily   8/5/2018 at am     TURMERIC PO Take 1 tablet by mouth every other day   8/4/2018 at pm             Review of Systems:   A comprehensive review of systems was performed and found to be negative except as described in this note.  Noted patient has been quite careful about losing weight recently by restricting his diet.          Physical Exam:   Vitals were reviewed  Temp: 96.9  F (36.1  C) Temp src: Oral BP: 158/79 Pulse: 73 Heart Rate: 54 Resp: 16 SpO2: 96 % O2 Device: None (Room air)    Constitutional: Awake, alert, cooperative, no apparent distress, and appears stated age.  Eyes: Lids and lashes  normal, pupils equal, round and reactive to light, extra ocular muscles intact, sclera clear, conjunctiva normal.  ENT: Normocephalic, without obvious abnormality.  Trauma noted over the right side of the face appears superficial with bruising.  Incidentally noted is bitemporal muscular loss.  Oral pharynx with moist mucus membranes, tonsils without erythema or exudates, gums normal and good dentition.  Neck: Supple, symmetrical, trachea midline, no adenopathy, thyroid symmetric, not enlarged and no tenderness, skin normal.  Hematologic / Lymphatic: No cervical lymphadenopathy and no supraclavicular lymphadenopathy.  Back: Symmetric, no curvature, spinous processes are non-tender on palpation, paraspinous muscles are non-tender on palpation, no costal vertebral tenderness.  Lungs: No increased work of breathing, good air exchange, clear to auscultation bilaterally, no crackles or wheezing.  Cardiovascular: Regular rate and rhythm, normal S1 and S2, no S3 or S4, and no murmur noted.  Abdomen: No scars, normal bowel sounds, soft, non-distended, non-tender, no masses palpated, no hepatosplenomegaly.  Musculoskeletal: No redness, warmth, or swelling of the joints. Tone is normal.  Neurologic: Awake, alert, oriented to name, place and time.  Cranial nerves II-XII are grossly intact.    Neuropsychiatric: Normal affect, mood, orientation, memory and insight.  Skin: No rashes, erythema, pallor, petechia or purpura.          Data:     Results for orders placed or performed during the hospital encounter of 08/05/18 (from the past 24 hour(s))   EKG 12 lead   Result Value Ref Range    Interpretation ECG Click View Image link to view waveform and result    CBC with platelets differential   Result Value Ref Range    WBC 7.7 4.0 - 11.0 10e9/L    RBC Count 4.65 4.4 - 5.9 10e12/L    Hemoglobin 13.5 13.3 - 17.7 g/dL    Hematocrit 40.7 40.0 - 53.0 %    MCV 88 78 - 100 fl    MCH 29.0 26.5 - 33.0 pg    MCHC 33.2 31.5 - 36.5 g/dL    RDW  12.6 10.0 - 15.0 %    Platelet Count 242 150 - 450 10e9/L    Diff Method Automated Method     % Neutrophils 43.1 %    % Lymphocytes 45.1 %    % Monocytes 8.6 %    % Eosinophils 2.0 %    % Basophils 0.8 %    % Immature Granulocytes 0.4 %    Nucleated RBCs 0 0 /100    Absolute Neutrophil 3.3 1.6 - 8.3 10e9/L    Absolute Lymphocytes 3.5 0.8 - 5.3 10e9/L    Absolute Monocytes 0.7 0.0 - 1.3 10e9/L    Absolute Eosinophils 0.2 0.0 - 0.7 10e9/L    Absolute Basophils 0.1 0.0 - 0.2 10e9/L    Abs Immature Granulocytes 0.0 0 - 0.4 10e9/L    Absolute Nucleated RBC 0.0    INR   Result Value Ref Range    INR 1.07 0.86 - 1.14   Basic metabolic panel   Result Value Ref Range    Sodium 140 133 - 144 mmol/L    Potassium 3.7 3.4 - 5.3 mmol/L    Chloride 106 94 - 109 mmol/L    Carbon Dioxide 28 20 - 32 mmol/L    Anion Gap 6 3 - 14 mmol/L    Glucose 114 (H) 70 - 99 mg/dL    Urea Nitrogen 18 7 - 30 mg/dL    Creatinine 1.15 0.66 - 1.25 mg/dL    GFR Estimate 63 >60 mL/min/1.7m2    GFR Estimate If Black 77 >60 mL/min/1.7m2    Calcium 8.4 (L) 8.5 - 10.1 mg/dL   Magnesium   Result Value Ref Range    Magnesium 2.1 1.6 - 2.3 mg/dL   Troponin POCT   Result Value Ref Range    Troponin I 0.00 0.00 - 0.10 ug/L   CT Head w/o Contrast    Narrative    CT OF THE HEAD WITHOUT CONTRAST 8/5/2018 4:48 PM     COMPARISON: None.    HISTORY: Syncope, facial trauma.      TECHNIQUE: 5 mm thick axial CT images of the head were acquired  without IV contrast material.    FINDINGS: There is moderate diffuse cerebral volume loss. There are  subtle patchy areas of decreased density in the cerebral white matter  bilaterally that are consistent with sequela of chronic small vessel  ischemic disease.    The ventricles and basal cisterns are within normal limits in  configuration given the degree of cerebral volume loss.  There is no  midline shift. There are no extra-axial fluid collections.    No intracranial hemorrhage, mass or recent infarct.    The visualized  paranasal sinuses are well-aerated. There is no  mastoiditis. There are no fractures of the visualized bones.      Impression    IMPRESSION: Diffuse cerebral volume loss and cerebral white matter  changes consistent with chronic small vessel ischemic disease. No  evidence for acute intracranial pathology.    Radiation dose for this scan was reduced using automated exposure  control, adjustment of the mA and/or kV according to patient size, or  iterative reconstruction technique.     EKG 12 lead   Result Value Ref Range    Interpretation ECG Click View Image link to view waveform and result       EKG results:   Performed on admission        Normal sinus rhythm, normal axis, no acute ischemic ST segment or T wave changes.  Please note that the QTC is less than 410.      All imaging studies reviewed by me.      Attestation:  I have reviewed today's vital signs, notes, medications, labs and imaging.  Total time: 25 minutes     Brett Marshall MD

## 2018-08-06 NOTE — TELEPHONE ENCOUNTER
Patient's wife called with update. Ptn is still admitted and awaiting further workup, including EKG and labs. States that his dizziness started after starting Flomax. For now, is not taking the Flomax.     Alaina Hartley

## 2018-08-06 NOTE — PLAN OF CARE
Problem: Patient Care Overview  Goal: Plan of Care/Patient Progress Review  ROOM # 322    Living Situation (if not independent, order SW consult): Lives with wife (Ariana)  Facility name:  : Ariana Landeros (Wife)    Activity level at baseline: Independent  Activity level on admit: Stand-By    Patient alert & orient x4. S/p Syncopal episode at home. Patient states that his daughter witnessed the fall when he attempted to get out up from the Dinner table & fell down to his Left side making impact to arm/side/hip-thigh areas. Complaints of a 'Lite headache' on admission to unit (Tylenol given in ED around 1830), patient voices these headaches will present when eyes are extra dry. PRN artifical tears ordered for patient's comfort. Tylenol PRN every 4 hrs next scheduled around 2230, patient states he does not wish to take too much medications & will wait for 2200 Tylenol dose. Patient voices complaints of discomfort starting on his Left side (including his Left arm/side), he voiced that the MD in the ER was made aware of the discomfort on his Left side getting worse. (+)CSM BUE/BLE. No respiratory or cardiac distress voiced or noted. Stand-by assist with ambulation 2* Syncopal episode which presented today.    Patient registered to observation; given Patient Bill of Rights; given the opportunity to ask questions about observation status and their plan of care.  Patient has been oriented to the observation room, bathroom and call light is in place.    Discussed discharge goals and expectations with patient/family.

## 2018-08-06 NOTE — TELEPHONE ENCOUNTER
Yesterday patient got up from chair and felt dizzy placed hands on wall and then took 2 steps and fell flat on face and LOC.  Patient was very confused glasses hit his face and now has bruising and a cut.  911 was called ambulance arrived and assessed.  No signs/sx of stroke per wife.  Patient was confused.  Admitted to hospital to be assessed by cardiology and have an echo of heart today (8/6/18).  Wife had to hang up due to hospital calling, will call back with update.    Alaina Keita RN  [employer] [facility]

## 2018-08-06 NOTE — PROGRESS NOTES
PRIMARY DIAGNOSIS: SYNCOPE/TIA  OUTPATIENT/OBSERVATION GOALS TO BE MET BEFORE DISCHARGE:  1. Orthostatic performed: Yes:          Lying Orthostatic BP: 122/69         Sitting Orthostatic BP: 120/81         Standing Orthostatic BP: 113/80     2. Diagnostic testing complete & at baseline neurologic testing: Yes    3. Cleared by consultants (if involved): Yes    4. Interpretation of cardiac rhythm per telemetry tech: sinus braulio    5. Tolerating adequate PO diet and medications: Yes    6. Return to near baseline physical activity or neurologic status: Yes    Discharge Planner Nurse   Safe discharge environment identified: yes  Barriers to discharge: Yes       Entered by: Darcie Callejas 08/06/2018 10:51 AM     Please review provider order for any additional goals.   Nurse to notify provider when observation goals have been met and patient is ready for discharge.    Pt a/o x 4; VSS, pt denies dizziness, n/v & SOB. Pt reports mild headache, administered tylenol. Tele: Sinus braulio. Bruise on right eye/cheek, no drainage. Pt refused eye drops. Possible discharge today. Will continue with plan of care.

## 2018-08-06 NOTE — PLAN OF CARE
VSS. A/O, steady on feet. C/o headache pain 2/10, gave tylenol x2 this shift, very effective for patient. Tele SB, consistent with report. CMP and trops to be checked in AM. Bruise to right cheek, pt says it feels tender but so does right half of body which he fell on. Possible discharge today.     PRIMARY DIAGNOSIS: SYNCOPE  OUTPATIENT/OBSERVATION GOALS TO BE MET BEFORE DISCHARGE:  1. Orthostatic performed: No    2. Diagnostic testing complete & at baseline neurologic testing: No    3. Cleared by consultants (if involved): No    4. Interpretation of cardiac rhythm per telemetry tech: Sinus bradycardia    5. Tolerating adequate PO diet and medications: Yes    6. Return to near baseline physical activity or neurologic status: Yes    Discharge Planner Nurse   Safe discharge environment identified: Yes  Barriers to discharge: No       Entered by: Sarahy Craven 08/06/2018 5:31 AM     Please review provider order for any additional goals.   Nurse to notify provider when observation goals have been met and patient is ready for discharge.

## 2018-08-13 ENCOUNTER — OFFICE VISIT (OUTPATIENT)
Dept: FAMILY MEDICINE | Facility: CLINIC | Age: 68
End: 2018-08-13
Payer: COMMERCIAL

## 2018-08-13 ENCOUNTER — RADIANT APPOINTMENT (OUTPATIENT)
Dept: GENERAL RADIOLOGY | Facility: CLINIC | Age: 68
End: 2018-08-13
Attending: FAMILY MEDICINE
Payer: COMMERCIAL

## 2018-08-13 VITALS
TEMPERATURE: 98 F | OXYGEN SATURATION: 97 % | WEIGHT: 174 LBS | HEIGHT: 70 IN | DIASTOLIC BLOOD PRESSURE: 76 MMHG | BODY MASS INDEX: 24.91 KG/M2 | SYSTOLIC BLOOD PRESSURE: 114 MMHG | HEART RATE: 73 BPM

## 2018-08-13 DIAGNOSIS — S22.31XA CLOSED FRACTURE OF ONE RIB OF RIGHT SIDE, INITIAL ENCOUNTER: ICD-10-CM

## 2018-08-13 DIAGNOSIS — R07.81 RIB PAIN ON RIGHT SIDE: ICD-10-CM

## 2018-08-13 DIAGNOSIS — R55 SYNCOPE, UNSPECIFIED SYNCOPE TYPE: Primary | ICD-10-CM

## 2018-08-13 PROCEDURE — 71101 X-RAY EXAM UNILAT RIBS/CHEST: CPT | Mod: RT

## 2018-08-13 PROCEDURE — 99213 OFFICE O/P EST LOW 20 MIN: CPT | Performed by: FAMILY MEDICINE

## 2018-08-13 NOTE — MR AVS SNAPSHOT
"              After Visit Summary   2018    Ernie Landeros    MRN: [id number]           Patient Information     Date Of Birth          1950        Visit Information        Provider Department      2018 2:40 PM Fredrick Butt MD [facility]        Today's Diagnoses     Syncope, unspecified syncope type    -  1    Rib pain on right side           Follow-ups after your visit        Follow-up notes from your care team     Return in about 1 month (around 2018), or if symptoms worsen or fail to improve.      Who to contact     If you have questions or need follow up information about today's clinic visit or your schedule please contact [facility] directly at [phone].  Normal or non-critical lab and imaging results will be communicated to you by MyChart, letter or phone within 4 business days after the clinic has received the results. If you do not hear from us within 7 days, please contact the clinic through MyChart or phone. If you have a critical or abnormal lab result, we will notify you by phone as soon as possible.  Submit refill requests through [email or web address] or call your pharmacy and they will forward the refill request to us. Please allow 3 business days for your refill to be completed.          Additional Information About Your Visit        MyChart Information     [email or web address] lets you send messages to your doctor, view your test results, renew your prescriptions, schedule appointments and more. To sign up, go to [email or web address] . Click on \"Log in\" on the left side of the screen, which will take you to the Welcome page. Then click on \"Sign up Now\" on the right side of the page.     You will be asked to enter the access code listed below, as well as some personal information. Please follow the directions to create your username and password.     Your access code is: [email or web address]  Expires: 11/3/2018  5:37 PM     Your access code will  in 90 days. " "If you need help or a new code, please call your [place] clinic or [phone].        Care EveryWhere ID     This is your Care EveryWhere ID. This could be used by other organizations to access your [place] medical records  [id number]        Your Vitals Were     Pulse Temperature Height Pulse Oximetry BMI (Body Mass Index)       73 98  F (36.7  C) (Oral) 5' 10\" (1.778 m) 97% 24.97 kg/m2        Blood Pressure from Last 3 Encounters:   08/13/18 114/76   08/06/18 135/90   02/01/18 112/70    Weight from Last 3 Encounters:   08/13/18 174 lb (78.9 kg)   08/05/18 174 lb 4.8 oz (79.1 kg)   02/01/18 178 lb (80.7 kg)               Primary Care Provider Office Phone # Fax #    Nitin Chopra [phone] [address] [facility] [address]        Equal Access to Services     [facility] ParkZH[facility] : Hadii aad ku hadasho Soomaali, waaxda luqadaha, qaybta kaalmada adeegyada, waxay maliain hayregla gil . So [facility] [phone].    ATENCIÓN: Si habla español, tiene a kohli disposición servicios gratuitos de asistencia lingüística. LlKettering [facility] [phone].    We comply with applicable federal civil rights laws and [place] laws. We do not discriminate on the basis of race, color, national origin, age, disability, sex, sexual orientation, or gender identity.            Thank you!     Thank you for choosing [facility]  for your care. Our goal is always to provide you with excellent care. Hearing back from our patients is one way we can continue to improve our services. Please take a few minutes to complete the written survey that you may receive in the mail after your visit with us. Thank you!             Your Updated Medication List - Protect others around you: Learn how to safely use, store and throw away your medicines at www.disposemymeds.org.          This list is accurate as of 8/13/18  3:24 PM.  Always use your most recent med list.                   Brand Name Dispense " Instructions for use Diagnosis    ascorbic acid 500 MG tablet    VITAMIN C    100 tablet    Take 500 mg by mouth every other day        aspirin 325 MG EC tablet     90 tablet    Take 325 mg by mouth every other day        calcium carbonate 500 MG tablet    OS-MICHELLE 500 mg [place]. Ca     Take 1 tablet by mouth every other day        cyanocobalamin 1000 MCG tablet    vitamin  B-12     Take 1,000 mcg by mouth every other day        fish oil-omega-3 fatty acids 1000 MG capsule      1 tablet by mouth every other day        Garlic 100 MG Tabs      1 tablet every other day        glucosamine-chondroitin 500-400 MG Caps per capsule      Take 1 capsule by mouth every other day        TURMERIC PO      Take 1 tablet by mouth every other day

## 2018-08-13 NOTE — PROGRESS NOTES
SUBJECTIVE:                                                      Ernie Landeros is a 67 year old male who presents to clinic today for the following health issues:        Hospital Follow-up Visit:    Hospital/Nursing Home/IP Rehab Facility: [facility]  Date of Admission: 08/05/2018  Date of Discharge: 08/06/2018  Reason(s) for Admission:     1. Syncopal Episode: Suspect vaso-vagal in nature, compounded by likely orthostatic hypotension due to initiation of Flomax      2. Rhythm disturbance seen on EKG lead: motion artifact. Not significant.             Problems taking medications regularly:  None       Medication changes since discharge: pt stopped taking flomax       Problems adhering to non-medication therapy:  None    Summary of hospitalization:  [facility] discharge summary reviewed  Diagnostic Tests/Treatments reviewed.  Follow up needed: none  Other Healthcare Providers Involved in Patient s Care:         None  Update since discharge: stable.     Post Discharge Medication Reconciliation: discharge medications reconciled and changed, per note/orders (see AVS).  Plan of care communicated with patient     Coding guidelines for this visit:  Type of Medical   Decision Making Face-to-Face Visit       within 7 Days of discharge Face-to-Face Visit        within 14 days of discharge   Moderate Complexity 69109 [id number]   High Complexity 14021 22042          Pt states he has been getting dizzy for the last 3-4 months now since starting flomax prescription. He drinks fluids regularly however says that his daughter would argue its not enough.     Pt reports his ribs are still very sore on his right side after his fall. He was lifting weights yesterday and couldn't take any extra pressure or strain to the right side of his torso. Pt denies feeling any clicking.    Problem list and histories reviewed & adjusted, as indicated.  Additional history: as documented    ROS:  Constitutional, HEENT,  "cardiovascular, pulmonary, GI, , musculoskeletal, neuro, skin, endocrine and psych systems are negative, except as otherwise noted.    This document serves as a record of the services and decisions personally performed and made by Fredrick Butt MD. It was created on his behalf by Nilton Chopra, a trained medical scribe. The creation of this document is based the provider's statements to the medical scribe.  Scribe Nilton Chopra 2:52 PM, August 13, 2018    OBJECTIVE:                                                      /76  Pulse 73  Temp 98  F (36.7  C) (Oral)  Ht 1.778 m (5' 10\")  Wt 78.9 kg (174 lb)  SpO2 97%  BMI 24.97 kg/m2 Body mass index is 24.97 kg/(m^2).   GENERAL: healthy, alert, well nourished, well hydrated, no distress  RESP: lungs clear to auscultation - no rales, no rhonchi, no wheezes  CV: regular rates and rhythm, normal S1 S2, no S3 or S4 and no murmur, no click or rub -  MS:  extremities- no gross deformities noted, no edema  SKIN: no suspicious lesions, no rashes  NEURO: strength and tone- normal, sensory exam- grossly normal, mentation- intact, speech- normal, reflexes- symmetric  CHEST/LUNGS: Right lateral inferior rib tenderness no crepitus;   BACK: no CVA tenderness, no paralumbar tenderness  PSYCH: Alert and oriented times 3; speech- coherent , normal rate and volume; able to articulate logical thoughts, able to abstract reason, no tangential thoughts, no hallucinations or delusions, affect- normal    Ribs and Chest Right XR:  Right 9th rib, nondisplaced distal fracture, no signs of pneumothorax.    ASSESSMENT/PLAN:                                                      Ernie was seen today for hospital f/u.    Diagnoses and all orders for this visit:    Syncope, unspecified syncope type - Patient has had no recurrent symptoms. He will continue monitoring and F/U if any signs of recurrence.    Rib pain on right side - Right 9th rib, nondisplaced distal fracture, no signs of " "pneumothorax.   -     XR Ribs & Chest Right G/E 3 Views; Future  -ice, Act as kacie      Risks, benefits and alternatives of treatments discussed. Plan agreed on.      Followup: Return in about 1 month (around 9/13/2018), or if symptoms worsen or fail to improve.    See patient instructions.       BMI:   Estimated body mass index is 24.97 kg/(m^2) as calculated from the following:    Height as of this encounter: 1.778 m (5' 10\").    Weight as of this encounter: 78.9 kg (174 lb).   Weight management plan: Discussed healthy diet and exercise guidelines and patient will follow up in 12 months in clinic to re-evaluate.      The information in this document, created by the medical scribe for me, accurately reflects the services I personally performed and the decisions made by me. I have reviewed and approved this document for accuracy prior to leaving the patient care area.  2:52 PM, 08/13/18        Norbert Butt MD   Pager: [phone]    "

## 2019-05-22 ENCOUNTER — OFFICE VISIT (OUTPATIENT)
Dept: FAMILY MEDICINE | Facility: CLINIC | Age: 69
End: 2019-05-22
Payer: COMMERCIAL

## 2019-05-22 VITALS
OXYGEN SATURATION: 96 % | HEART RATE: 64 BPM | SYSTOLIC BLOOD PRESSURE: 136 MMHG | WEIGHT: 180 LBS | BODY MASS INDEX: 25.77 KG/M2 | TEMPERATURE: 97.7 F | DIASTOLIC BLOOD PRESSURE: 80 MMHG | HEIGHT: 70 IN

## 2019-05-22 DIAGNOSIS — Z23 NEED FOR PNEUMOCOCCAL VACCINATION: ICD-10-CM

## 2019-05-22 DIAGNOSIS — R39.12 BENIGN PROSTATIC HYPERPLASIA WITH WEAK URINARY STREAM: ICD-10-CM

## 2019-05-22 DIAGNOSIS — Z12.5 SCREENING FOR PROSTATE CANCER: ICD-10-CM

## 2019-05-22 DIAGNOSIS — N40.1 BENIGN PROSTATIC HYPERPLASIA WITH WEAK URINARY STREAM: ICD-10-CM

## 2019-05-22 DIAGNOSIS — Z00.00 ENCOUNTER FOR ROUTINE ADULT HEALTH EXAMINATION WITHOUT ABNORMAL FINDINGS: Primary | ICD-10-CM

## 2019-05-22 DIAGNOSIS — Z13.6 CARDIOVASCULAR SCREENING; LDL GOAL LESS THAN 130: ICD-10-CM

## 2019-05-22 DIAGNOSIS — Z12.11 SCREEN FOR COLON CANCER: ICD-10-CM

## 2019-05-22 DIAGNOSIS — K21.9 GASTROESOPHAGEAL REFLUX DISEASE WITHOUT ESOPHAGITIS: ICD-10-CM

## 2019-05-22 LAB
ALBUMIN UR-MCNC: NEGATIVE MG/DL
APPEARANCE UR: CLEAR
BILIRUB UR QL STRIP: NEGATIVE
COLOR UR AUTO: YELLOW
ERYTHROCYTE [DISTWIDTH] IN BLOOD BY AUTOMATED COUNT: 12.6 % (ref 10–15)
GLUCOSE UR STRIP-MCNC: NEGATIVE MG/DL
HCT VFR BLD AUTO: 42.5 % (ref 40–53)
HGB BLD-MCNC: 14.7 G/DL (ref 13.3–17.7)
HGB UR QL STRIP: NEGATIVE
KETONES UR STRIP-MCNC: NEGATIVE MG/DL
LEUKOCYTE ESTERASE UR QL STRIP: NEGATIVE
MCH RBC QN AUTO: 29.1 PG (ref 26.5–33)
MCHC RBC AUTO-ENTMCNC: 34.6 G/DL (ref 31.5–36.5)
MCV RBC AUTO: 84 FL (ref 78–100)
NITRATE UR QL: NEGATIVE
PH UR STRIP: 7 PH (ref 5–7)
PLATELET # BLD AUTO: 246 10E9/L (ref 150–450)
RBC # BLD AUTO: 5.05 10E12/L (ref 4.4–5.9)
SOURCE: NORMAL
SP GR UR STRIP: 1.01 (ref 1–1.03)
UROBILINOGEN UR STRIP-ACNC: 0.2 EU/DL (ref 0.2–1)
WBC # BLD AUTO: 6.3 10E9/L (ref 4–11)

## 2019-05-22 PROCEDURE — 82274 ASSAY TEST FOR BLOOD FECAL: CPT | Performed by: FAMILY MEDICINE

## 2019-05-22 PROCEDURE — 90732 PPSV23 VACC 2 YRS+ SUBQ/IM: CPT | Performed by: FAMILY MEDICINE

## 2019-05-22 PROCEDURE — G0439 PPPS, SUBSEQ VISIT: HCPCS | Performed by: FAMILY MEDICINE

## 2019-05-22 PROCEDURE — 85027 COMPLETE CBC AUTOMATED: CPT | Performed by: FAMILY MEDICINE

## 2019-05-22 PROCEDURE — 84443 ASSAY THYROID STIM HORMONE: CPT | Performed by: FAMILY MEDICINE

## 2019-05-22 PROCEDURE — 80053 COMPREHEN METABOLIC PANEL: CPT | Performed by: FAMILY MEDICINE

## 2019-05-22 PROCEDURE — G0103 PSA SCREENING: HCPCS | Performed by: FAMILY MEDICINE

## 2019-05-22 PROCEDURE — 80061 LIPID PANEL: CPT | Performed by: FAMILY MEDICINE

## 2019-05-22 PROCEDURE — 82550 ASSAY OF CK (CPK): CPT | Performed by: FAMILY MEDICINE

## 2019-05-22 PROCEDURE — 81003 URINALYSIS AUTO W/O SCOPE: CPT | Performed by: FAMILY MEDICINE

## 2019-05-22 PROCEDURE — G0009 ADMIN PNEUMOCOCCAL VACCINE: HCPCS | Performed by: FAMILY MEDICINE

## 2019-05-22 PROCEDURE — 82043 UR ALBUMIN QUANTITATIVE: CPT | Performed by: FAMILY MEDICINE

## 2019-05-22 PROCEDURE — 36415 COLL VENOUS BLD VENIPUNCTURE: CPT | Performed by: FAMILY MEDICINE

## 2019-05-22 ASSESSMENT — ACTIVITIES OF DAILY LIVING (ADL): CURRENT_FUNCTION: NO ASSISTANCE NEEDED

## 2019-05-22 ASSESSMENT — MIFFLIN-ST. JEOR: SCORE: 1597.72

## 2019-05-22 NOTE — LETTER
[facility]  [address], [place] [address] [phone]   May 28, 2019    Ernie MALDONADO Quiring  [address] [place] [address]      Dear Ernie,    Here is a summary of your recent test results:    Labs are overall quite good, except borderline glucose.     We advise:     Continue current cares.   Balanced low cholesterol diet.   Regular exercise.     Recheck fasting labs in 4-6 months (glucose, a1c, lipid reflex)     Your test results are enclosed.      Please contact me if you have any questions.            Thank you very much for trusting [facility].    Healthy regards,        Nitin Chopra M.D.        Results for orders placed or performed in visit on 05/22/19   Comprehensive metabolic panel   Result Value Ref Range    Sodium 138 133 - 144 mmol/L    Potassium 4.6 3.4 - 5.3 mmol/L    Chloride 108 94 - 109 mmol/L    Carbon Dioxide 29 20 - 32 mmol/L    Anion Gap 1 (L) 3 - 14 mmol/L    Glucose 100 (H) 70 - 99 mg/dL    Urea Nitrogen 15 7 - 30 mg/dL    Creatinine 1.14 0.66 - 1.25 mg/dL    GFR Estimate 66 >60 mL/min/[1.73_m2]    GFR Estimate If Black 76 >60 mL/min/[1.73_m2]    Calcium 8.9 8.5 - 10.1 mg/dL    Bilirubin Total 0.7 0.2 - 1.3 mg/dL    Albumin 4.0 3.4 - 5.0 g/dL    Protein Total 7.5 6.8 - 8.8 g/dL    Alkaline Phosphatase 58 40 - 150 U/L    ALT 22 0 - 70 U/L    AST 32 0 - 45 U/L   Lipid panel reflex to direct LDL Fasting   Result Value Ref Range    Cholesterol 181 <200 mg/dL    Triglycerides 114 <150 mg/dL    HDL Cholesterol 45 >39 mg/dL    LDL Cholesterol Calculated 113 (H) <100 mg/dL    Non HDL Cholesterol 136 (H) <130 mg/dL   CK total   Result Value Ref Range    CK Total 152 30 - 300 U/L   CBC with platelets   Result Value Ref Range    WBC 6.3 4.0 - 11.0 10e9/L    RBC Count 5.05 4.4 - 5.9 10e12/L    Hemoglobin 14.7 13.3 - 17.7 g/dL    Hematocrit 42.5 40.0 - 53.0 %    MCV 84 78 - 100 fl    MCH 29.1 26.5 - 33.0 pg     MCHC 34.6 31.5 - 36.5 g/dL    RDW 12.6 10.0 - 15.0 %    Platelet Count 246 150 - 450 10e9/L   TSH with free T4 reflex   Result Value Ref Range    TSH 1.53 0.40 - 4.00 mU/L   UA reflex to Microscopic and Culture   Result Value Ref Range    Color Urine Yellow     Appearance Urine Clear     Glucose Urine Negative NEG^Negative mg/dL    Bilirubin Urine Negative NEG^Negative    Ketones Urine Negative NEG^Negative mg/dL    Specific Gravity Urine 1.010 1.003 - 1.035    Blood Urine Negative NEG^Negative    pH Urine 7.0 5.0 - 7.0 pH    Protein Albumin Urine Negative NEG^Negative mg/dL    Urobilinogen Urine 0.2 0.2 - 1.0 EU/dL    Nitrite Urine Negative NEG^Negative    Leukocyte Esterase Urine Negative NEG^Negative    Source Midstream Urine    Albumin Random Urine Quantitative with Creat Ratio   Result Value Ref Range    Creatinine Urine 26 mg/dL    Albumin Urine mg/L <5 mg/L    Albumin Urine mg/g Cr Unable to calculate due to low value 0 - 17 mg/g Cr   Prostate spec antigen screen   Result Value Ref Range    PSA 0.64 0 - 4 ug/L

## 2019-05-22 NOTE — PROGRESS NOTES
"SUBJECTIVE:   Ernie Landeros is a 68 year old male who presents for Preventive Visit.    Are you in the first 12 months of your Medicare coverage?  No    Healthy Habits:    In general, how would you rate your overall health?  Very good    Frequency of exercise:  6-7 days/week    Duration of exercise:  30-45 minutes (weights, bike )    Do you usually eat at least 4 servings of fruit and vegetables a day, include whole grains    & fiber and avoid regularly eating high fat or \"junk\" foods?  No    Taking medications regularly:  Yes    Barriers to taking medications:  None    Medication side effects:  None    Ability to successfully perform activities of daily living:  No assistance needed    Home Safety:  No safety concerns identified    Hearing Impairment:  No hearing concerns    In the past 6 months, have you been bothered by leaking of urine?  No    In general, how would you rate your overall mental or emotional health?  Excellent      PHQ-2 Total Score:    Additional concerns today:  Yes (dry eyes at night - waking him at night)    Benign Prostatic Hyperplasia: Patient reports that he has been having some issues with leaking after urination. Patient questions if a urologist can help with this issue. Patient states he wakes up every 3-4 hours at night to pee.     GERD: Stable. Patient's GERD is well controlled by diet.     Do you feel safe in your environment? Yes    Do you have a Health Care Directive? Yes: Advance Directive has been received and scanned.    Fall risk  Fallen 2 or more times in the past year?: No  Any fall with injury in the past year?: No    Cognitive Screening   1) Repeat 3 items (Leader, Season, Table)    2) Clock draw: NORMAL  3) 3 item recall: Recalls 2 objects   Results: NORMAL clock, 1-2 items recalled: COGNITIVE IMPAIRMENT LESS LIKELY    Mini-CogTM Copyright [employer]. Licensed by the author for use in [facility]; reprinted with permission (woody@.[facility]). All rights reserved.  "     Do you have sleep apnea, excessive snoring or daytime drowsiness?: no    Reviewed and updated as needed this visit by clinical staff  Tobacco  Allergies  Meds  Med Hx  Surg Hx  Fam Hx  Soc Hx        Reviewed and updated as needed this visit by Provider        Social History     Tobacco Use     Smoking status: Never Smoker     Smokeless tobacco: Never Used   Substance Use Topics     Alcohol use: No       Alcohol Use 12/21/2016   Prescreen: >3 drinks/day or >7 drinks/week? The patient does not drink >3 drinks per day nor >7 drinks per week.       Current providers sharing in care for this patient include:   Patient Care Team:  Nitin Chopra MD as PCP - General  Fredrick Butt MD as Assigned PCP    The following health maintenance items are reviewed in Epic and correct as of today:  Health Maintenance   Topic Date Due     LIPID  11/30/1986     MEDICARE ANNUAL WELLNESS VISIT  12/21/2017     PSA  12/21/2017     FALL RISK ASSESSMENT  12/21/2017     FIT  12/23/2017     DTAP/TDAP/TD IMMUNIZATION (3 - Td) 05/02/2022     ADVANCED DIRECTIVE PLANNING  05/22/2024     HEPATITIS C SCREENING  Completed     PHQ-2  Completed     INFLUENZA VACCINE  Completed     ZOSTER IMMUNIZATION  Completed     AORTIC ANEURYSM SCREENING (SYSTEM ASSIGNED)  Completed     IPV IMMUNIZATION  Aged Out     MENINGITIS IMMUNIZATION  Aged Out     Health Maintenance     Colonoscopy:  Last 4/30/2014, Due 4/2024   FIT:  Last 12/23/2016, Due              PSA:  Last 4/19/2018, Due   DEXA:  N/A    Health Maintenance Due   Topic Date Due     LIPID  11/30/1986     MEDICARE ANNUAL WELLNESS VISIT  12/21/2017     PSA  12/21/2017     FALL RISK ASSESSMENT  12/21/2017     FIT  12/23/2017       Current Problem List    Patient Active Problem List   Diagnosis     CARDIOVASCULAR SCREENING; LDL GOAL LESS THAN 130     ACP (advance care planning)     GERD (gastroesophageal reflux disease)     BPH (benign prostatic hyperplasia)       Past Medical History    Past  Medical History:   Diagnosis Date     BPH (benign prostatic hyperplasia)      CARDIOVASCULAR SCREENING; LDL GOAL LESS THAN 130      GERD (gastroesophageal reflux disease)        Past Surgical History    Past Surgical History:   Procedure Laterality Date     HC COLONOSCOPY THRU STOMA, DIAGNOSTIC  12/04, 4/14    normal - Dr Valente - due 2024     HC REMOVE TONSILS/ADENOIDS,<11 Y/O  1958    T & A <13y.o.     HC REPAIR ING HERNIA,5+Y/O,REDUCIBL  1955    bilateral inguinal hernia     STRESS TEST  04/2018    normal - [facility]     SURGICAL HISTORY OF -   8/05    Lt Knee tibial plateua fx - dr morales     SURGICAL HISTORY OF -   2009    Lt carpal tunnel - dr morales     SURGICAL HISTORY OF -   1/14    Rt carpal tunnel - dr morales       Current Medications    Current Outpatient Medications   Medication Sig Dispense Refill     ascorbic acid (VITAMIN C) 500 MG tablet Take 500 mg by mouth every other day  100 tablet 12     aspirin  MG EC tablet Take 325 mg by mouth every other day  90 tablet 3     calcium carbonate (OS-MICHELLE 500 MG [place]. [place]) 500 MG tablet Take 1 tablet by mouth every other day       cyanocobalamin (VITAMIN  B-12) 1000 MCG tablet Take 1,000 mcg by mouth every other day       FISH OIL 1000 MG OR CAPS 1 tablet by mouth every other day  0     GARLIC 100 MG OR TABS 1 tablet every other day  0     glucosamine-chondroitin 500-400 MG CAPS per capsule Take 1 capsule by mouth every other day       TURMERIC PO Take 1 tablet by mouth every other day         Allergies    No Known Allergies    Immunizations    Immunization History   Administered Date(s) Administered     Influenza (High Dose) 3 valent vaccine 09/07/2017, 09/11/2018     Influenza (IIV3) PF 11/25/2003, 10/17/2006, 10/01/2009, 09/22/2010, 09/09/2011, 09/19/2012     Influenza Vaccine IM 3yrs+ 4 Valent IIV4 09/18/2013, 09/18/2014, 10/05/2015     Pneumo Conj 13-V (2010&after) 12/21/2016     Pneumococcal 23 valent 05/02/2014, 05/22/2019     TD (ADULT, 7+)  10/17/2003     TDAP Vaccine (Boostrix) 05/02/2012     Twinrix A/B 10/17/2003, 11/25/2003, 04/16/2004     Typhoid IM 10/17/2003     Zoster vaccine recombinant adjuvanted (SHINGRIX) 05/04/2018, 07/23/2018     Zoster vaccine, live 10/05/2015       Family History    Family History   Problem Relation Age of Onset     Arthritis Mother      Neurologic Disorder Father         Parkinsons     Hypertension Father      Hypertension Brother      Lipids Brother      Pancreatic Cancer Brother      Heart Disease Maternal Grandfather      Seizure Disorder Brother        Social History    Social History     Socioeconomic History     Marital status:      Spouse name: Ariana     Number of children: 2     Years of education: 18     Highest education level: Not on file   Occupational History     Employer: SELF   Social Needs     Financial resource strain: Not on file     Food insecurity:     Worry: Not on file     Inability: Not on file     Transportation needs:     Medical: Not on file     Non-medical: Not on file   Tobacco Use     Smoking status: Never Smoker     Smokeless tobacco: Never Used   Substance and Sexual Activity     Alcohol use: No     Drug use: No     Sexual activity: Yes     Partners: Female   Lifestyle     Physical activity:     Days per week: Not on file     Minutes per session: Not on file     Stress: Not on file   Relationships     Social connections:     Talks on phone: Not on file     Gets together: Not on file     Attends Faith service: Not on file     Active member of club or organization: Not on file     Attends meetings of clubs or organizations: Not on file     Relationship status: Not on file     Intimate partner violence:     Fear of current or ex partner: Not on file     Emotionally abused: Not on file     Physically abused: Not on file     Forced sexual activity: Not on file   Other Topics Concern      Service Not Asked     Blood Transfusions Not Asked     Caffeine Concern Yes      "Comment: 2 cups a day     Occupational Exposure Not Asked     Hobby Hazards Not Asked     Sleep Concern Not Asked     Stress Concern Not Asked     Weight Concern Not Asked     Special Diet Not Asked     Back Care Not Asked     Exercise Yes     Comment: bikes walks lifts wts 3-5 times per week - 10 miles per week     Bike Helmet Not Asked     Seat Belt Yes     Self-Exams Not Asked     Parent/sibling w/ CABG, MI or angioplasty before 65F 55M? No   Social History Narrative     Not on file       Review of Systems  Constitutional, HEENT, cardiovascular, pulmonary, GI, , musculoskeletal, neuro, skin, endocrine and psych systems are negative, except as otherwise noted.    OBJECTIVE:   /80   Pulse 64   Temp 97.7  F (36.5  C) (Oral)   Ht 1.778 m (5' 10\")   Wt 81.6 kg (180 lb)   SpO2 96%   BMI 25.83 kg/m   Estimated body mass index is 25.83 kg/m  as calculated from the following:    Height as of this encounter: 1.778 m (5' 10\").    Weight as of this encounter: 81.6 kg (180 lb).  Physical Exam  GENERAL: healthy, alert and no distress  EYES: Eyes grossly normal to inspection  HENT:ear canals and TM's normal upon viewing with otoscope, nose and mouth without ulcers or lesions upon viewing with otoscope  NECK: no adenopathy, no asymmetry, masses, or scars and thyroid normal to palpation  RESP: lungs clear to auscultation - no rales, rhonchi or wheezes  CV: regular rate and rhythm, normal S1 S2, no S3 or S4, no murmur, click or rub, no peripheral edema and peripheral pulses strong  ABDOMEN: soft, nontender, no hepatosplenomegaly, no masses and bowel sounds normal   (male)/RECTAL: Declined  MS: no gross musculoskeletal defects noted, no edema  SKIN: no suspicious lesions or rashes  NEURO: Normal strength and tone, mentation intact and speech normal  PSYCH: mentation appears normal, affect normal/bright  BACK: no CVA tenderness, no paralumbar tenderness    Diagnostic Test Results:    Results for orders placed or " performed in visit on 05/22/19 (from the past 24 hour(s))   UA reflex to Microscopic and Culture   Result Value Ref Range    Color Urine Yellow     Appearance Urine Clear     Glucose Urine Negative NEG^Negative mg/dL    Bilirubin Urine Negative NEG^Negative    Ketones Urine Negative NEG^Negative mg/dL    Specific Gravity Urine 1.010 1.003 - 1.035    Blood Urine Negative NEG^Negative    pH Urine 7.0 5.0 - 7.0 pH    Protein Albumin Urine Negative NEG^Negative mg/dL    Urobilinogen Urine 0.2 0.2 - 1.0 EU/dL    Nitrite Urine Negative NEG^Negative    Leukocyte Esterase Urine Negative NEG^Negative    Source Midstream Urine    CBC with platelets   Result Value Ref Range    WBC 6.3 4.0 - 11.0 10e9/L    RBC Count 5.05 4.4 - 5.9 10e12/L    Hemoglobin 14.7 13.3 - 17.7 g/dL    Hematocrit 42.5 40.0 - 53.0 %    MCV 84 78 - 100 fl    MCH 29.1 26.5 - 33.0 pg    MCHC 34.6 31.5 - 36.5 g/dL    RDW 12.6 10.0 - 15.0 %    Platelet Count 246 150 - 450 10e9/L       ASSESSMENT / PLAN:       ICD-10-CM    1. Encounter for routine adult health examination without abnormal findings Z00.00 Comprehensive metabolic panel     Lipid panel reflex to direct LDL Fasting     CK total     CBC with platelets     TSH with free T4 reflex     UA reflex to Microscopic and Culture     Albumin Random Urine Quantitative with Creat Ratio     Prostate spec antigen screen     Fecal colorectal cancer screen FIT   2. CARDIOVASCULAR SCREENING; LDL GOAL LESS THAN 130 Z13.6 Lipid panel reflex to direct LDL Fasting   3. Gastroesophageal reflux disease without esophagitis K21.9 CBC with platelets   4. Benign prostatic hyperplasia with weak urinary stream N40.1 Prostate spec antigen screen    R39.12 UROLOGY ADULT REFERRAL   5. Screening for prostate cancer Z12.5 Prostate spec antigen screen   6. Screen for colon cancer Z12.11 Fecal colorectal cancer screen FIT   7. Need for pneumococcal vaccination Z23 PNEUMOCOCCAL VACCINE,ADULT,SQ OR IM     ADMIN 1st VACCINE     Discussed  "treatment/modality options, including risk and benefits, he desires advised 1 multivitamin per day, advised dentist every 6 months, advised diet and exercise and advised opthalmologist every 1-2 years. All diagnosis above reviewed and noted above, otherwise stable.  See [facility] orders for further details.      1) Patient referred to urology today for further BPH management due to leaking.     2) Received Pneumovax 23 today.     3) Follow up in 1 year for complete physical exam.     Health Maintenance Due   Topic Date Due     LIPID  11/30/1986     MEDICARE ANNUAL WELLNESS VISIT  12/21/2017     PSA  12/21/2017     FALL RISK ASSESSMENT  12/21/2017     FIT  12/23/2017       End of Life Planning:  Patient currently has an advanced directive: Yes.  Practitioner is supportive of decision.    COUNSELING:  Reviewed preventive health counseling, as reflected in patient instructions    Estimated body mass index is 25.83 kg/m  as calculated from the following:    Height as of this encounter: 1.778 m (5' 10\").    Weight as of this encounter: 81.6 kg (180 lb).     reports that he has never smoked. He has never used smokeless tobacco.    Appropriate preventive services were discussed with this patient, including applicable screening as appropriate for cardiovascular disease, diabetes, osteopenia/osteoporosis, and glaucoma.  As appropriate for age/gender, discussed screening for colorectal cancer, prostate cancer, breast cancer, and cervical cancer. Checklist reviewing preventive services available has been given to the patient.    Reviewed patients plan of care and provided an AVS. The Basic Care Plan (routine screening as documented in [facility] Maintenance) for Ernie meets the Care Plan requirement. This Care Plan has been established and reviewed with the Patient.    Counseling Resources:  ATP IV Guidelines  Pooled Cohorts Equation Calculator  Breast Cancer Risk Calculator  FRAX Risk Assessment  ICSI Preventive " Guidelines  Dietary Guidelines for Americans, 2010  USDA's MyPlate  ASA Prophylaxis  Lung CA Screening    This document serves as a record of the services and decisions personally performed and made by Nitin Chopra MD. It was created on his behalf by Morgan Mazariegos, a trained medical scribe. The creation of this document is based on the provider's statements to the medical scribe.  Morgan Mazariegos May 22, 2019 10:51 AM     The information in this document, created by the medical scribe for me, accurately reflects the services I personally performed and the decisions made by me. I have reviewed and approved this document for accuracy prior to leaving the patient care area.  May 22, 2019          Nitin Chopra MD, [address]  [phone] Fax

## 2019-05-22 NOTE — PATIENT INSTRUCTIONS
[facility]                        To reach your care team during and after hours:   [phone]  To reach our pharmacy:        [phone]    Clinic Hours                        Our clinic hours are:    Monday   7:30 am to 7:00 pm                  Tuesday through Friday 7:30 am to 5:00 pm                             Saturday   8:00 am to 12:00 pm      Sunday   Closed      Pharmacy Hours                        Our pharmacy hours are:    Monday   8:30 am to 7:00 pm       Tuesday to Friday  8:30 am to 6:00 pm                       Saturday    9:00 am to 1:00 pm              Sunday    Closed              There is also information available at our web site:  [email or web address]    If your provider ordered any lab tests and you do not receive the results within 10 business days, please call the clinic.    If you need a medication refill please contact your pharmacy.  Please allow 2-3 business days for your refill to be completed.    Our clinic offers telephone visits and e visits.  Please ask one of your team members to explain more.      Use [employer] (secure email communication and access to your chart) to send your primary care provider a message or make an appointment. Ask someone on your Team how to sign up for [facility].  Immunizations                      Immunization History   Administered Date(s) Administered     Influenza (High Dose) 3 valent vaccine 09/07/2017, 09/11/2018     Influenza (IIV3) PF 11/25/2003, 10/17/2006, 10/01/2009, 09/22/2010, 09/09/2011, 09/19/2012     Influenza Vaccine IM 3yrs+ 4 Valent IIV4 09/18/2013, 09/18/2014, 10/05/2015     Pneumo Conj 13-V (2010&after) 12/21/2016     Pneumococcal 23 valent 05/02/2014     TD (ADULT, 7+) 10/17/2003     TDAP Vaccine (Boostrix) 05/02/2012     Twinrix A/B 10/17/2003, 11/25/2003, 04/16/2004     Typhoid IM 10/17/2003     Zoster vaccine recombinant adjuvanted (SHINGRIX) 05/04/2018, 07/23/2018     Zoster vaccine, live 10/05/2015        [facility]  Maintenance                         Health Maintenance Due   Topic Date Due     Cholesterol Lab  11/30/1986     Annual Wellness Visit  12/21/2017     Prostate Test  12/21/2017     FALL RISK ASSESSMENT  12/21/2017     FIT Test  12/23/2017       Patient Education   Personalized Prevention Plan  You are due for the preventive services outlined below.  Your care team is available to assist you in scheduling these services.  If you have already completed any of these items, please share that information with your care team to update in your medical record.  Health Maintenance Due   Topic Date Due     Cholesterol Lab  11/30/1986     Annual Wellness Visit  12/21/2017     Prostate Test  12/21/2017     FALL RISK ASSESSMENT  12/21/2017     FIT Test  12/23/2017     PHQ-2  01/01/2019     Preventive Health Recommendations  See your health care provider every year to    Review health changes.     Discuss preventive care.      Review your medicines if your doctor has prescribed any.    Talk with your health care provider about whether you should have a test to screen for prostate cancer (PSA).    Every 3 years, have a diabetes test (fasting glucose). If you are at risk for diabetes, you should have this test more often.    Every 5 years, have a cholesterol test. Have this test more often if you are at risk for high cholesterol or heart disease.     Every 10 years, have a colonoscopy. Or, have a yearly FIT test (stool test). These exams will check for colon cancer.    Talk to with your health care provider about screening for Abdominal Aortic Aneurysm if you have a family history of AAA or have a history of smoking.    Shots:     Get a flu shot each year.     Get a tetanus shot every 10 years.     Talk to your doctor about your pneumonia vaccines. There are now two you should receive - Pneumovax (PPSV 23) and Prevnar (PCV 13).    Talk to your pharmacist about a shingles vaccine.     Talk to your doctor about the hepatitis B  vaccine.    Nutrition:     Eat at least 5 servings of fruits and vegetables each day.     Eat whole-grain bread, whole-wheat pasta and brown rice instead of white grains and rice.     Get adequate Calcium and Vitamin D.     Lifestyle    Exercise for at least 150 minutes a week (30 minutes a day, 5 days a week). This will help you control your weight and prevent disease.     Limit alcohol to one drink per day.     No smoking.     Wear sunscreen to prevent skin cancer.     See your dentist every six months for an exam and cleaning.     See your eye doctor every 1 to 2 years to screen for conditions such as glaucoma, macular degeneration and cataracts.    Personalized Prevention Plan  You are due for the preventive services outlined below.  Your care team is available to assist you in scheduling these services.  If you have already completed any of these items, please share that information with your care team to update in your medical record.  Health Maintenance Due   Topic Date Due     Cholesterol Lab  11/30/1986     Annual Wellness Visit  12/21/2017     Prostate Test  12/21/2017     FALL RISK ASSESSMENT  12/21/2017     FIT Test  12/23/2017     PHQ-2  01/01/2019

## 2019-05-23 LAB
ALBUMIN SERPL-MCNC: 4 G/DL (ref 3.4–5)
ALP SERPL-CCNC: 58 U/L (ref 40–150)
ALT SERPL W P-5'-P-CCNC: 22 U/L (ref 0–70)
ANION GAP SERPL CALCULATED.3IONS-SCNC: 1 MMOL/L (ref 3–14)
AST SERPL W P-5'-P-CCNC: 32 U/L (ref 0–45)
BILIRUB SERPL-MCNC: 0.7 MG/DL (ref 0.2–1.3)
BUN SERPL-MCNC: 15 MG/DL (ref 7–30)
CALCIUM SERPL-MCNC: 8.9 MG/DL (ref 8.5–10.1)
CHLORIDE SERPL-SCNC: 108 MMOL/L (ref 94–109)
CHOLEST SERPL-MCNC: 181 MG/DL
CK SERPL-CCNC: 152 U/L (ref 30–300)
CO2 SERPL-SCNC: 29 MMOL/L (ref 20–32)
CREAT SERPL-MCNC: 1.14 MG/DL (ref 0.66–1.25)
CREAT UR-MCNC: 26 MG/DL
GFR SERPL CREATININE-BSD FRML MDRD: 66 ML/MIN/{1.73_M2}
GLUCOSE SERPL-MCNC: 100 MG/DL (ref 70–99)
HDLC SERPL-MCNC: 45 MG/DL
LDLC SERPL CALC-MCNC: 113 MG/DL
MICROALBUMIN UR-MCNC: <5 MG/L
MICROALBUMIN/CREAT UR: NORMAL MG/G CR (ref 0–17)
NONHDLC SERPL-MCNC: 136 MG/DL
POTASSIUM SERPL-SCNC: 4.6 MMOL/L (ref 3.4–5.3)
PROT SERPL-MCNC: 7.5 G/DL (ref 6.8–8.8)
PSA SERPL-ACNC: 0.64 UG/L (ref 0–4)
SODIUM SERPL-SCNC: 138 MMOL/L (ref 133–144)
TRIGL SERPL-MCNC: 114 MG/DL
TSH SERPL DL<=0.005 MIU/L-ACNC: 1.53 MU/L (ref 0.4–4)

## 2019-05-27 LAB — HEMOCCULT STL QL IA: NEGATIVE

## 2019-05-28 DIAGNOSIS — Z12.11 SCREEN FOR COLON CANCER: ICD-10-CM

## 2019-05-28 DIAGNOSIS — Z00.00 ENCOUNTER FOR ROUTINE ADULT HEALTH EXAMINATION WITHOUT ABNORMAL FINDINGS: ICD-10-CM

## 2019-06-19 ENCOUNTER — TELEPHONE (OUTPATIENT)
Dept: FAMILY MEDICINE | Facility: CLINIC | Age: 69
End: 2019-06-19
Payer: COMMERCIAL

## 2019-06-21 DIAGNOSIS — N40.0 BPH (BENIGN PROSTATIC HYPERPLASIA): Primary | ICD-10-CM

## 2019-06-25 ENCOUNTER — OFFICE VISIT (OUTPATIENT)
Dept: FAMILY MEDICINE | Facility: CLINIC | Age: 69
End: 2019-06-25
Payer: COMMERCIAL

## 2019-06-25 VITALS
OXYGEN SATURATION: 95 % | WEIGHT: 174 LBS | SYSTOLIC BLOOD PRESSURE: 130 MMHG | HEART RATE: 85 BPM | HEIGHT: 70 IN | TEMPERATURE: 98.4 F | DIASTOLIC BLOOD PRESSURE: 78 MMHG | BODY MASS INDEX: 24.91 KG/M2

## 2019-06-25 DIAGNOSIS — J06.9 ACUTE URI: Primary | ICD-10-CM

## 2019-06-25 PROCEDURE — 99213 OFFICE O/P EST LOW 20 MIN: CPT | Performed by: FAMILY MEDICINE

## 2019-06-25 RX ORDER — AZITHROMYCIN 250 MG/1
TABLET, FILM COATED ORAL
Qty: 6 TABLET | Refills: 0 | Status: SHIPPED | OUTPATIENT
Start: 2019-06-25 | End: 2021-05-24

## 2019-06-25 ASSESSMENT — MIFFLIN-ST. JEOR: SCORE: 1570.51

## 2019-06-25 NOTE — PROGRESS NOTES
SUBJECTIVE:                                                    Ernie Landeros is a 68 year old male who presents to clinic today for the following health issues:    Acute Illness     Acute illness concerns: URI    Onset: 1 week    Fever: no    Chills/Sweats: YES- 1-2 nights    Headache (location?): YES    Sinus Pressure:YES    Conjunctivitis:  YES: bilateral    Ear Pain: no    Rhinorrhea: YES    Congestion: YES    Sore Throat: YES-mild due to coughing    Post Nasal Drip: YES     Cough: YES-productive of yellow sputum    Wheeze: YES    Decreased Appetite: YES    Nausea: no    Vomiting: no    Diarrhea:  no    Dysuria/Freq.: no    Fatigue/Achiness: YES    Sick/Strep Exposure: YES- just came back from river cruise in [place] - 30+ people were sick on the cruise     Therapies Tried and outcome: OTC decongestant(Wall D)    Patient states his cough is due to post nasal drip. Patient states he took Wall-D that dried him out, increased difficulty urinating, and he wasn't coughing when his sinuses were dry. Patient states he has had some weakness and some minimal SOB.     Patient has an appointment with Dr. Rutledge of Urology scheduled for tomorrow for further BPH management.    Reviewed and updated as needed this visit by Provider       BP Readings from Last 3 Encounters:   06/25/19 130/78   05/22/19 136/80   08/13/18 114/76       body mass index is 24.97 kg/m .    Wt Readings from Last 4 Encounters:   06/25/19 78.9 kg (174 lb)   05/22/19 81.6 kg (180 lb)   08/13/18 78.9 kg (174 lb)   08/05/18 79.1 kg (174 lb 4.8 oz)       Health Maintenance    There are no preventive care reminders to display for this patient.    Current Problem List    Patient Active Problem List   Diagnosis     CARDIOVASCULAR SCREENING; LDL GOAL LESS THAN 130     ACP (advance care planning)     GERD (gastroesophageal reflux disease)     BPH (benign prostatic hyperplasia)       Past Medical History    Past Medical History:   Diagnosis Date     BPH  (benign prostatic hyperplasia)      CARDIOVASCULAR SCREENING; LDL GOAL LESS THAN 130      GERD (gastroesophageal reflux disease)        Past Surgical History    Past Surgical History:   Procedure Laterality Date     HC COLONOSCOPY THRU STOMA, DIAGNOSTIC  12/04, 4/14    normal - Dr Valente - due 2024     HC REMOVE TONSILS/ADENOIDS,<13 Y/O  1958    T & A <13y.o.     HC REPAIR ING HERNIA,5+Y/O,REDUCIBL  1955    bilateral inguinal hernia     STRESS TEST  04/2018    normal - [place]     SURGICAL HISTORY OF -   8/05    Lt Knee tibial plateua fx - dr morales     SURGICAL HISTORY OF -   2009    Lt carpal tunnel - dr morales     SURGICAL HISTORY OF -   1/14    Rt carpal tunnel - dr morales       Current Medications    Current Outpatient Medications   Medication Sig Dispense Refill     ascorbic acid (VITAMIN C) 500 MG tablet Take 500 mg by mouth every other day  100 tablet 12     aspirin  MG EC tablet Take 325 mg by mouth every other day  90 tablet 3     azithromycin (ZITHROMAX) 250 MG tablet 2 tabs day 1 and the 1 tab daily for 4 more days 6 tablet 0     calcium carbonate (OS-MICHELLE 500 MG [place]. [place]) 500 MG tablet Take 1 tablet by mouth every other day       cyanocobalamin (VITAMIN  B-12) 1000 MCG tablet Take 1,000 mcg by mouth every other day       FISH OIL 1000 MG OR CAPS 1 tablet by mouth every other day  0     GARLIC 100 MG OR TABS 1 tablet every other day  0     glucosamine-chondroitin 500-400 MG CAPS per capsule Take 1 capsule by mouth every other day       TURMERIC PO Take 1 tablet by mouth every other day         Allergies    No Known Allergies    Immunizations    Immunization History   Administered Date(s) Administered     Influenza (High Dose) 3 valent vaccine 09/07/2017, 09/11/2018     Influenza (IIV3) PF 11/25/2003, 10/17/2006, 10/01/2009, 09/22/2010, 09/09/2011, 09/19/2012     Influenza Vaccine IM 3yrs+ 4 Valent IIV4 09/18/2013, 09/18/2014, 10/05/2015     Pneumo Conj 13-V (2010&after) 12/21/2016     Pneumococcal  23 valent 05/02/2014, 05/22/2019     TD (ADULT, 7+) 10/17/2003     TDAP Vaccine (Boostrix) 05/02/2012     Twinrix A/B 10/17/2003, 11/25/2003, 04/16/2004     Typhoid IM 10/17/2003     Zoster vaccine recombinant adjuvanted (SHINGRIX) 05/04/2018, 07/23/2018     Zoster vaccine, live 10/05/2015       Family History    Family History   Problem Relation Age of Onset     Arthritis Mother      Neurologic Disorder Father         Parkinsons     Hypertension Father      Hypertension Brother      Lipids Brother      Pancreatic Cancer Brother      Heart Disease Maternal Grandfather      Seizure Disorder Brother        Social History    Social History     Socioeconomic History     Marital status:      Spouse name: Ariana     Number of children: 2     Years of education: 18     Highest education level: Not on file   Occupational History     Employer: SELF   Social Needs     Financial resource strain: Not on file     Food insecurity:     Worry: Not on file     Inability: Not on file     Transportation needs:     Medical: Not on file     Non-medical: Not on file   Tobacco Use     Smoking status: Never Smoker     Smokeless tobacco: Never Used   Substance and Sexual Activity     Alcohol use: No     Drug use: No     Sexual activity: Yes     Partners: Female   Lifestyle     Physical activity:     Days per week: Not on file     Minutes per session: Not on file     Stress: Not on file   Relationships     Social connections:     Talks on phone: Not on file     Gets together: Not on file     Attends [employer] service: Not on file     Active member of club or organization: Not on file     Attends meetings of clubs or organizations: Not on file     Relationship status: Not on file     Intimate partner violence:     Fear of current or ex partner: Not on file     Emotionally abused: Not on file     Physically abused: Not on file     Forced sexual activity: Not on file   Other Topics Concern      Service Not Asked     Blood  "Transfusions Not Asked     Caffeine Concern Yes     Comment: 2 cups a day     Occupational Exposure Not Asked     Hobby Hazards Not Asked     Sleep Concern Not Asked     Stress Concern Not Asked     Weight Concern Not Asked     Special Diet Not Asked     Back Care Not Asked     Exercise Yes     Comment: bikes walks lifts wts 3-5 times per week - 10 miles per week     Bike Helmet Not Asked     Seat Belt Yes     Self-Exams Not Asked     Parent/sibling w/ CABG, MI or angioplasty before 65F 55M? No   Social History Narrative     Not on file       All above reviewed and updated, all stable unless otherwise noted    Recent labs reviewed    ROS:  Constitutional, HEENT, cardiovascular, pulmonary, GI, , musculoskeletal, neuro, skin, endocrine and psych systems are negative, except as otherwise noted.    OBJECTIVE:                                                    /78   Pulse 85   Temp 98.4  F (36.9  C) (Oral)   Ht 1.778 m (5' 10\")   Wt 78.9 kg (174 lb)   SpO2 95%   BMI 24.97 kg/m    Body mass index is 24.97 kg/m .  GENERAL: healthy, alert and no distress  EYES: Eyes grossly normal to inspection  HENT:ear canals and TM's normal upon viewing with otoscope, nose and mouth without ulcers or lesions upon viewing with otoscope  NECK: no tenderness, no adenopathy, no asymmetry, no masses, no stiffness; thyroid- normal to palpation  RESP: lungs clear to auscultation - no rales, no rhonchi, no wheezes  CV: regular rates and rhythm, normal S1 S2, no S3 or S4 and no murmur, no click or rub -  ABDOMEN: soft, no tenderness, no  hepatosplenomegaly, no masses, normal bowel sounds  MS: extremities- no gross deformities noted, no edema  SKIN: no suspicious lesions, no rashes  NEURO: strength and tone- normal, sensory exam- grossly normal, mentation- intact, speech- normal, reflexes- symmetric  BACK: no CVA tenderness, no paralumbar tenderness  PSYCH: Alert and oriented times 3; speech- coherent , normal rate and volume; able " to articulate logical thoughts, able to abstract reason, no tangential thoughts, no hallucinations or delusions, affect- normal    DIAGNOSTICS/PROCEDURES:                                                      No results found for this or any previous visit (from the past 24 hour(s)).     ASSESSMENT:                                                        ICD-10-CM    1. Acute URI J06.9 azithromycin (ZITHROMAX) 250 MG tablet       PLAN:                                                    Discussed treatment/modality options, including risk and benefits he desires:    advised 1 multivitamin per day, advised dentist every 6 months, advised diet and exercise and advised opthalmologist every 1-2 years.     1) Patient presented today with a cough. Patient prescribed Zithromax today for acute URI treatment. Recommend warm showers, good handwashing, Mucinex DM, and Tylenol as needed.     2) Follow up if symptoms persist or worsen.     3) Follow up in 11 months for complete physical exam.    All diagnosis above reviewed and noted above, otherwise stable.  See [facility] orders for further details.     Return in about 11 months (around 5/25/2020), or if symptoms worsen or fail to improve, for Complete Physical.    There are no preventive care reminders to display for this patient.    This document serves as a record of the services and decisions personally performed and made by Nitin Chopra MD. It was created on his behalf by Morgan Mazariegos, a trained medical scribe. The creation of this document is based on the provider's statements to the medical scribe.  Morgan Mazariegos June 25, 2019 10:14 AM     The information in this document, created by the medical scribe for me, accurately reflects the services I personally performed and the decisions made by me. I have reviewed and approved this document for accuracy prior to leaving the patient care area.  June 25, 2019            Nitin Chopra MD [address] [place]  [address]  [phone] Fax

## 2019-06-26 ENCOUNTER — OFFICE VISIT (OUTPATIENT)
Dept: UROLOGY | Facility: CLINIC | Age: 69
End: 2019-06-26
Attending: FAMILY MEDICINE
Payer: COMMERCIAL

## 2019-06-26 VITALS — HEART RATE: 60 BPM | BODY MASS INDEX: 24.34 KG/M2 | HEIGHT: 70 IN | WEIGHT: 170 LBS | OXYGEN SATURATION: 98 %

## 2019-06-26 DIAGNOSIS — N40.0 ENLARGED PROSTATE: Primary | ICD-10-CM

## 2019-06-26 DIAGNOSIS — R39.198 SLOWING OF URINARY STREAM: ICD-10-CM

## 2019-06-26 LAB
ALBUMIN UR-MCNC: NEGATIVE MG/DL
APPEARANCE UR: CLEAR
BILIRUB UR QL STRIP: NEGATIVE
COLOR UR AUTO: YELLOW
GLUCOSE UR STRIP-MCNC: NEGATIVE MG/DL
HGB UR QL STRIP: NEGATIVE
KETONES UR STRIP-MCNC: NEGATIVE MG/DL
LEUKOCYTE ESTERASE UR QL STRIP: NEGATIVE
NITRATE UR QL: NEGATIVE
PH UR STRIP: 5.5 PH (ref 5–7)
RESIDUAL VOLUME (RV) (EXTERNAL): 23
SOURCE: NORMAL
SP GR UR STRIP: 1.02 (ref 1–1.03)
UROBILINOGEN UR STRIP-ACNC: 0.2 EU/DL (ref 0.2–1)

## 2019-06-26 PROCEDURE — 99203 OFFICE O/P NEW LOW 30 MIN: CPT | Mod: 25 | Performed by: UROLOGY

## 2019-06-26 PROCEDURE — 51798 US URINE CAPACITY MEASURE: CPT | Performed by: UROLOGY

## 2019-06-26 PROCEDURE — 81003 URINALYSIS AUTO W/O SCOPE: CPT | Mod: QW | Performed by: UROLOGY

## 2019-06-26 ASSESSMENT — MIFFLIN-ST. JEOR: SCORE: 1552.36

## 2019-06-26 ASSESSMENT — PAIN SCALES - GENERAL: PAINLEVEL: NO PAIN (0)

## 2019-06-26 NOTE — LETTER
6/26/2019       RE: Ernie Landeros  [address]  [place] [address]     Dear Colleague,    Thank you for referring your patient, Ernie Landeros, to the [facility] [place] at [facility]. Please see a copy of my visit note below.    [facility]  Main Office: [address]  [address]  [place] [address]       CHIEF COMPLAINT:  Slow urinary stream    HISTORY:   I was asked by Dr Chopra to see this 68-year-old gentleman who has noted a slowing of his urinary stream over the past few years.He was seen for an executive physical exam at the [facility] about 1 year ago and reported this problem at the time and was started on Flomax.He said that the Flomax made him dizzy and actually made him faint and hit his head once.He does not think he noticed much difference in his urinary stream on the Flomax. He continues to have a slow urinary stream and is here today for this issue. He does not complain of frequency. He has no nocturia.He has no history of gross hematuria or infections. No family history of prostate cancer. His main complaint is that his stream is slow and he takes a long time to finish going to the bathroom.      PAST MEDICAL HISTORY:   Past Medical History:   Diagnosis Date     BPH (benign prostatic hyperplasia)      CARDIOVASCULAR SCREENING; LDL GOAL LESS THAN 130      GERD (gastroesophageal reflux disease)      Mumps        PAST SURGICAL HISTORY:   Past Surgical History:   Procedure Laterality Date     HC COLONOSCOPY THRU STOMA, DIAGNOSTIC  12/04, 4/14    normal - Dr Valente - due 2024     HC REMOVE TONSILS/ADENOIDS,<11 Y/O  1958    T & A <13y.o.     HC REPAIR ING HERNIA,5+Y/O,REDUCIBL  1955    bilateral inguinal hernia     STRESS TEST  04/2018    normal - [place]     SURGICAL HISTORY OF -   8/05    Lt Knee tibial plateua fx - dr morales     SURGICAL HISTORY OF -   2009    Lt carpal tunnel - dr morales      SURGICAL HISTORY OF -   1/14    Rt carpal tunnel - dr morales       FAMILY HISTORY:   Family History   Problem Relation Age of Onset     Arthritis Mother      Neurologic Disorder Father         Parkinsons     Hypertension Father      Hypertension Brother      Lipids Brother      Pancreatic Cancer Brother      Heart Disease Maternal Grandfather      Seizure Disorder Brother        SOCIAL HISTORY:   Social History     Tobacco Use     Smoking status: Never Smoker     Smokeless tobacco: Never Used   Substance Use Topics     Alcohol use: No        No Known Allergies      Current Outpatient Medications:      ascorbic acid (VITAMIN C) 500 MG tablet, Take 500 mg by mouth every other day , Disp: 100 tablet, Rfl: 12     aspirin  MG EC tablet, Take 325 mg by mouth every other day , Disp: 90 tablet, Rfl: 3     azithromycin (ZITHROMAX) 250 MG tablet, 2 tabs day 1 and the 1 tab daily for 4 more days, Disp: 6 tablet, Rfl: 0     calcium carbonate (OS-MICHELLE 500 MG [place]. [place]) 500 MG tablet, Take 1 tablet by mouth every other day, Disp: , Rfl:      cyanocobalamin (VITAMIN  B-12) 1000 MCG tablet, Take 1,000 mcg by mouth every other day, Disp: , Rfl:      FISH OIL 1000 MG OR CAPS, 1 tablet by mouth every other day, Disp: , Rfl: 0     GARLIC 100 MG OR TABS, 1 tablet every other day, Disp: , Rfl: 0     glucosamine-chondroitin 500-400 MG CAPS per capsule, Take 1 capsule by mouth every other day, Disp: , Rfl:      TURMERIC PO, Take 1 tablet by mouth every other day, Disp: , Rfl:     Review Of Systems:  Skin: No rash, pruritis, or skin pigmentation  Eyes: No changes in vision  Ears/Nose/Throat: No changes in hearing, no nosebleeds  Respiratory: No shortness of breath, dyspnea on exertion, cough, or hemoptysis  Cardiovascular: No chest pain or palpitations  Gastrointestinal: No diarrhea or constipation. No abdominal pain. No hematochezia  Genitourinary: see HPI  Musculoskeletal: No pain or swelling of joints, normal range of  "motion  Neurologic: No weakness or tremors  Psychiatric: No recent changes in memory or mood  Hematologic/Lymphatic/Immunologic: No easy bruising or enlarged lymph nodes  Endocrine: No weight gain or loss      PHYSICAL EXAM:    Pulse 60   Ht 1.778 m (5' 10\")   Wt 77.1 kg (170 lb)   SpO2 98%   BMI 24.39 kg/m     General appearance: In NAD, conversant  HEENT: Normocephalic and atraumatic, anicteric sclera  Cardiovascular: Not examined  Respiratory: normal, non-labored breathing  Gastrointestinal: negative, Abdomen soft, non-tender, and non-distended.   Musculoskeletal: Not Examined  Peripheral Vascular/extremity: No peripheral edema  Skin: Normal temperature, turgor, and texture. No rash  Psychiatric: Appropriate affect, alert and oriented to person, place, and time    Penis: Normal  Scrotal skin: Normal, no lesions  Testicles: Normal to palpation bilaterally  Epididymis: Normal to palpation bilaterally  Lymphatic: Normal inguinal lymph nodes    Digital Rectal Exam: the prostate is somewhat enlarged, benign and symmetric to palpation    Cystoscopy: Not done      PSA: 0.64    UA RESULTS:  Recent Labs   Lab Test 05/22/19  0943   COLOR Yellow   APPEARANCE Clear   URINEGLC Negative   URINEBILI Negative   URINEKETONE Negative   SG 1.010   UBLD Negative   URINEPH 7.0   PROTEIN Negative   UROBILINOGEN 0.2   NITRITE Negative   LEUKEST Negative       Bladder Scan: 23mL    Other Labs:      Imaging Studies: None      CLINICAL IMPRESSION:   Slow urinary stream, enlarged prostate    PLAN:   He has an enlarged prostate causing a slow urinary stream. Otherwise, his PSA is low and he is emptying his bladder well. We discussed treatment options for his enlarged prostate. These include another medication that would be less likely to cause the dizziness versus surgical procedures. Right now he says that the symptoms do not bother him enough that he would want anything done. However, if the symptoms are bothersome to him in the " future he should follow up with me at that time. He will continue to see Dr. Chopra on an annual basis for his PSA checks and follow-up with me as needed.      Andre Rutledge MD      Again, thank you for allowing me to participate in the care of your patient.      Sincerely,    Andre Rutledge MD

## 2019-06-26 NOTE — PROGRESS NOTES
[facility]  Main Office: [address]  [address]  [place] [address]       CHIEF COMPLAINT:  Slow urinary stream    HISTORY:   I was asked by Dr Chopra to see this 68-year-old gentleman who has noted a slowing of his urinary stream over the past few years.He was seen for an executive physical exam at the [facility] about 1 year ago and reported this problem at the time and was started on Flomax.He said that the Flomax made him dizzy and actually made him faint and hit his head once.He does not think he noticed much difference in his urinary stream on the Flomax. He continues to have a slow urinary stream and is here today for this issue. He does not complain of frequency. He has no nocturia.He has no history of gross hematuria or infections. No family history of prostate cancer. His main complaint is that his stream is slow and he takes a long time to finish going to the bathroom.      PAST MEDICAL HISTORY:   Past Medical History:   Diagnosis Date     BPH (benign prostatic hyperplasia)      CARDIOVASCULAR SCREENING; LDL GOAL LESS THAN 130      GERD (gastroesophageal reflux disease)      Mumps        PAST SURGICAL HISTORY:   Past Surgical History:   Procedure Laterality Date     HC COLONOSCOPY THRU STOMA, DIAGNOSTIC  12/04, 4/14    normal - Dr Valente - due 2024     HC REMOVE TONSILS/ADENOIDS,<11 Y/O  1958    T & A <13y.o.     HC REPAIR ING HERNIA,5+Y/O,REDUCIBL  1955    bilateral inguinal hernia     STRESS TEST  04/2018    normal - [place]     SURGICAL HISTORY OF -   8/05    Lt Knee tibial plateua fx - dr morales     SURGICAL HISTORY OF -   2009    Lt carpal tunnel - dr morales     SURGICAL HISTORY OF -   1/14    Rt carpal tunnel - dr morales       FAMILY HISTORY:   Family History   Problem Relation Age of Onset     Arthritis Mother      Neurologic Disorder Father         Parkinsons     Hypertension Father      Hypertension Brother      Lipids Brother      Pancreatic Cancer Brother      Heart Disease  "Maternal Grandfather      Seizure Disorder Brother        SOCIAL HISTORY:   Social History     Tobacco Use     Smoking status: Never Smoker     Smokeless tobacco: Never Used   Substance Use Topics     Alcohol use: No        No Known Allergies      Current Outpatient Medications:      ascorbic acid (VITAMIN C) 500 MG tablet, Take 500 mg by mouth every other day , Disp: 100 tablet, Rfl: 12     aspirin  MG EC tablet, Take 325 mg by mouth every other day , Disp: 90 tablet, Rfl: 3     azithromycin (ZITHROMAX) 250 MG tablet, 2 tabs day 1 and the 1 tab daily for 4 more days, Disp: 6 tablet, Rfl: 0     calcium carbonate (OS-MICHELLE 500 MG [place]. [place]) 500 MG tablet, Take 1 tablet by mouth every other day, Disp: , Rfl:      cyanocobalamin (VITAMIN  B-12) 1000 MCG tablet, Take 1,000 mcg by mouth every other day, Disp: , Rfl:      FISH OIL 1000 MG OR CAPS, 1 tablet by mouth every other day, Disp: , Rfl: 0     GARLIC 100 MG OR TABS, 1 tablet every other day, Disp: , Rfl: 0     glucosamine-chondroitin 500-400 MG CAPS per capsule, Take 1 capsule by mouth every other day, Disp: , Rfl:      TURMERIC PO, Take 1 tablet by mouth every other day, Disp: , Rfl:     Review Of Systems:  Skin: No rash, pruritis, or skin pigmentation  Eyes: No changes in vision  Ears/Nose/Throat: No changes in hearing, no nosebleeds  Respiratory: No shortness of breath, dyspnea on exertion, cough, or hemoptysis  Cardiovascular: No chest pain or palpitations  Gastrointestinal: No diarrhea or constipation. No abdominal pain. No hematochezia  Genitourinary: see HPI  Musculoskeletal: No pain or swelling of joints, normal range of motion  Neurologic: No weakness or tremors  Psychiatric: No recent changes in memory or mood  Hematologic/Lymphatic/Immunologic: No easy bruising or enlarged lymph nodes  Endocrine: No weight gain or loss      PHYSICAL EXAM:    Pulse 60   Ht 1.778 m (5' 10\")   Wt 77.1 kg (170 lb)   SpO2 98%   BMI 24.39 kg/m    General appearance: In " NAD, conversant  HEENT: Normocephalic and atraumatic, anicteric sclera  Cardiovascular: Not examined  Respiratory: normal, non-labored breathing  Gastrointestinal: negative, Abdomen soft, non-tender, and non-distended.   Musculoskeletal: Not Examined  Peripheral Vascular/extremity: No peripheral edema  Skin: Normal temperature, turgor, and texture. No rash  Psychiatric: Appropriate affect, alert and oriented to person, place, and time    Penis: Normal  Scrotal skin: Normal, no lesions  Testicles: Normal to palpation bilaterally  Epididymis: Normal to palpation bilaterally  Lymphatic: Normal inguinal lymph nodes    Digital Rectal Exam: the prostate is somewhat enlarged, benign and symmetric to palpation    Cystoscopy: Not done      PSA: 0.64    UA RESULTS:  Recent Labs   Lab Test 05/22/19  0943   COLOR Yellow   APPEARANCE Clear   URINEGLC Negative   URINEBILI Negative   URINEKETONE Negative   SG 1.010   UBLD Negative   URINEPH 7.0   PROTEIN Negative   UROBILINOGEN 0.2   NITRITE Negative   LEUKEST Negative       Bladder Scan: 23mL    Other Labs:      Imaging Studies: None      CLINICAL IMPRESSION:   Slow urinary stream, enlarged prostate    PLAN:   He has an enlarged prostate causing a slow urinary stream. Otherwise, his PSA is low and he is emptying his bladder well. We discussed treatment options for his enlarged prostate. These include another medication that would be less likely to cause the dizziness versus surgical procedures. Right now he says that the symptoms do not bother him enough that he would want anything done. However, if the symptoms are bothersome to him in the future he should follow up with me at that time. He will continue to see Dr. Chopra on an annual basis for his PSA checks and follow-up with me as needed.      Andre Rutledge MD

## 2019-06-26 NOTE — NURSING NOTE
pvr by scanner=23mL  Pt up about 4 times a nt.  Pt states what he drinks in the evening doesn't affect his getting up at nt.  Pt states he has a slow stream.  Pt denies gross hem or dysuria.  Pt denies constipation problems.  AUGUSTUS Herrmann CMA

## 2020-09-08 ENCOUNTER — TRANSFERRED RECORDS (OUTPATIENT)
Dept: HEALTH INFORMATION MANAGEMENT | Facility: CLINIC | Age: 70
End: 2020-09-08

## 2020-11-13 ENCOUNTER — TRANSFERRED RECORDS (OUTPATIENT)
Dept: HEALTH INFORMATION MANAGEMENT | Facility: CLINIC | Age: 70
End: 2020-11-13

## 2021-03-27 ENCOUNTER — HEALTH MAINTENANCE LETTER (OUTPATIENT)
Age: 71
End: 2021-03-27

## 2021-05-24 ENCOUNTER — OFFICE VISIT (OUTPATIENT)
Dept: FAMILY MEDICINE | Facility: CLINIC | Age: 71
End: 2021-05-24
Payer: COMMERCIAL

## 2021-05-24 VITALS
TEMPERATURE: 98.2 F | WEIGHT: 177 LBS | HEART RATE: 68 BPM | RESPIRATION RATE: 20 BRPM | DIASTOLIC BLOOD PRESSURE: 74 MMHG | SYSTOLIC BLOOD PRESSURE: 128 MMHG | OXYGEN SATURATION: 99 % | HEIGHT: 70 IN | BODY MASS INDEX: 25.34 KG/M2

## 2021-05-24 DIAGNOSIS — Z00.00 ROUTINE GENERAL MEDICAL EXAMINATION AT A HEALTH CARE FACILITY: Primary | ICD-10-CM

## 2021-05-24 DIAGNOSIS — H04.123 DRY EYES: ICD-10-CM

## 2021-05-24 DIAGNOSIS — R39.12 BENIGN PROSTATIC HYPERPLASIA WITH WEAK URINARY STREAM: ICD-10-CM

## 2021-05-24 DIAGNOSIS — Z13.6 CARDIOVASCULAR SCREENING; LDL GOAL LESS THAN 130: ICD-10-CM

## 2021-05-24 DIAGNOSIS — Z51.81 MEDICATION MONITORING ENCOUNTER: ICD-10-CM

## 2021-05-24 DIAGNOSIS — Z12.11 SPECIAL SCREENING FOR MALIGNANT NEOPLASMS, COLON: ICD-10-CM

## 2021-05-24 DIAGNOSIS — K21.9 GASTROESOPHAGEAL REFLUX DISEASE WITHOUT ESOPHAGITIS: ICD-10-CM

## 2021-05-24 DIAGNOSIS — Z12.5 SCREENING FOR PROSTATE CANCER: ICD-10-CM

## 2021-05-24 DIAGNOSIS — Z00.00 MEDICARE ANNUAL WELLNESS VISIT, SUBSEQUENT: ICD-10-CM

## 2021-05-24 DIAGNOSIS — N40.1 BENIGN PROSTATIC HYPERPLASIA WITH WEAK URINARY STREAM: ICD-10-CM

## 2021-05-24 LAB
ALBUMIN SERPL-MCNC: 3.7 G/DL (ref 3.4–5)
ALBUMIN UR-MCNC: NEGATIVE MG/DL
ALP SERPL-CCNC: 64 U/L (ref 40–150)
ALT SERPL W P-5'-P-CCNC: 26 U/L (ref 0–70)
ANION GAP SERPL CALCULATED.3IONS-SCNC: 3 MMOL/L (ref 3–14)
APPEARANCE UR: CLEAR
AST SERPL W P-5'-P-CCNC: 38 U/L (ref 0–45)
BILIRUB SERPL-MCNC: 0.6 MG/DL (ref 0.2–1.3)
BILIRUB UR QL STRIP: NEGATIVE
BUN SERPL-MCNC: 15 MG/DL (ref 7–30)
CALCIUM SERPL-MCNC: 9.6 MG/DL (ref 8.5–10.1)
CHLORIDE SERPL-SCNC: 103 MMOL/L (ref 94–109)
CHOLEST SERPL-MCNC: 168 MG/DL
CK SERPL-CCNC: 162 U/L (ref 30–300)
CO2 SERPL-SCNC: 30 MMOL/L (ref 20–32)
COLOR UR AUTO: YELLOW
CREAT SERPL-MCNC: 1.13 MG/DL (ref 0.66–1.25)
ERYTHROCYTE [DISTWIDTH] IN BLOOD BY AUTOMATED COUNT: 12.4 % (ref 10–15)
GFR SERPL CREATININE-BSD FRML MDRD: 66 ML/MIN/{1.73_M2}
GLUCOSE SERPL-MCNC: 93 MG/DL (ref 70–99)
GLUCOSE UR STRIP-MCNC: NEGATIVE MG/DL
HCT VFR BLD AUTO: 43.2 % (ref 40–53)
HDLC SERPL-MCNC: 43 MG/DL
HGB BLD-MCNC: 14.4 G/DL (ref 13.3–17.7)
HGB UR QL STRIP: NEGATIVE
KETONES UR STRIP-MCNC: NEGATIVE MG/DL
LDLC SERPL CALC-MCNC: 102 MG/DL
LEUKOCYTE ESTERASE UR QL STRIP: NEGATIVE
MCH RBC QN AUTO: 28.8 PG (ref 26.5–33)
MCHC RBC AUTO-ENTMCNC: 33.3 G/DL (ref 31.5–36.5)
MCV RBC AUTO: 86 FL (ref 78–100)
NITRATE UR QL: NEGATIVE
NONHDLC SERPL-MCNC: 125 MG/DL
PH UR STRIP: 7 PH (ref 5–7)
PLATELET # BLD AUTO: 263 10E9/L (ref 150–450)
POTASSIUM SERPL-SCNC: 4.3 MMOL/L (ref 3.4–5.3)
PROT SERPL-MCNC: 7.7 G/DL (ref 6.8–8.8)
PSA SERPL-ACNC: 0.79 UG/L (ref 0–4)
RBC # BLD AUTO: 5 10E12/L (ref 4.4–5.9)
SODIUM SERPL-SCNC: 136 MMOL/L (ref 133–144)
SOURCE: NORMAL
SP GR UR STRIP: 1.01 (ref 1–1.03)
TRIGL SERPL-MCNC: 113 MG/DL
TSH SERPL DL<=0.005 MIU/L-ACNC: 1.32 MU/L (ref 0.4–4)
UROBILINOGEN UR STRIP-ACNC: 0.2 EU/DL (ref 0.2–1)
WBC # BLD AUTO: 5.9 10E9/L (ref 4–11)

## 2021-05-24 PROCEDURE — 82550 ASSAY OF CK (CPK): CPT | Performed by: FAMILY MEDICINE

## 2021-05-24 PROCEDURE — 85027 COMPLETE CBC AUTOMATED: CPT | Performed by: FAMILY MEDICINE

## 2021-05-24 PROCEDURE — 80053 COMPREHEN METABOLIC PANEL: CPT | Performed by: FAMILY MEDICINE

## 2021-05-24 PROCEDURE — 99397 PER PM REEVAL EST PAT 65+ YR: CPT | Performed by: FAMILY MEDICINE

## 2021-05-24 PROCEDURE — 80061 LIPID PANEL: CPT | Performed by: FAMILY MEDICINE

## 2021-05-24 PROCEDURE — 86235 NUCLEAR ANTIGEN ANTIBODY: CPT | Performed by: FAMILY MEDICINE

## 2021-05-24 PROCEDURE — G0103 PSA SCREENING: HCPCS | Performed by: FAMILY MEDICINE

## 2021-05-24 PROCEDURE — 81003 URINALYSIS AUTO W/O SCOPE: CPT | Performed by: FAMILY MEDICINE

## 2021-05-24 PROCEDURE — 82043 UR ALBUMIN QUANTITATIVE: CPT | Performed by: FAMILY MEDICINE

## 2021-05-24 PROCEDURE — 84443 ASSAY THYROID STIM HORMONE: CPT | Performed by: FAMILY MEDICINE

## 2021-05-24 PROCEDURE — 36415 COLL VENOUS BLD VENIPUNCTURE: CPT | Performed by: FAMILY MEDICINE

## 2021-05-24 ASSESSMENT — MIFFLIN-ST. JEOR: SCORE: 1574.12

## 2021-05-24 ASSESSMENT — ACTIVITIES OF DAILY LIVING (ADL): CURRENT_FUNCTION: NO ASSISTANCE NEEDED

## 2021-05-24 NOTE — PROGRESS NOTES
"[facility]    JEFF Landeros is a 70 year old male who presents for Preventive Visit.    Patient has been advised of split billing requirements and indicates understanding: Yes   Are you in the first 12 months of your Medicare coverage?  No    Healthy Habits:     In general, how would you rate your overall health?  Excellent    Frequency of exercise:  4-5 days/week    Duration of exercise:  30-45 minutes    Do you usually eat at least 4 servings of fruit and vegetables a day, include whole grains    & fiber and avoid regularly eating high fat or \"junk\" foods?  Yes    Taking medications regularly:  Yes    Medication side effects:  None    Ability to successfully perform activities of daily living:  No assistance needed    Home Safety:  No safety concerns identified    Hearing Impairment:  No hearing concerns    In the past 6 months, have you been bothered by leaking of urine?  No    In general, how would you rate your overall mental or emotional health?  Excellent      PHQ-2 Total Score: 0    Additional concerns today:  No    Do you feel safe in your environment? Yes    Have you ever done Advance Care Planning? (For example, a Health Directive, POLST, or a discussion with a medical provider or your loved ones about your wishes): Yes, advance care planning is on file.       Fall risk  Fallen 2 or more times in the past year?: No  Any fall with injury in the past year?: No    Cognitive Screening   1) Repeat 3 items (Leader, Season, Table)      2) Clock draw:   NORMAL  3) 3 item recall:   Recalls 3 objects  Results: NORMAL clock, 1-2 items recalled: COGNITIVE IMPAIRMENT LESS LIKELY    Mini-CogTM Copyright ARNOLDO Sorenson. Licensed by the author for use in [facility]; reprinted with permission ([email or web address]). All rights reserved.      Do you have sleep apnea, excessive snoring or daytime drowsiness?: no    Reviewed and updated as needed this visit by clinical staff  Tobacco  " Allergies  Meds              Reviewed and updated as needed this visit by Provider                Social History     Tobacco Use     Smoking status: Never Smoker     Smokeless tobacco: Never Used   Substance Use Topics     Alcohol use: No         Alcohol Use 5/24/2021   Prescreen: >3 drinks/day or >7 drinks/week? Not Applicable   Prescreen: >3 drinks/day or >7 drinks/week? -       Current providers sharing in care for this patient include:   Patient Care Team:  Nitin Chopra MD as PCP - General  Nitin Chopra MD as Assigned PCP    The following health maintenance items are reviewed in Epic and correct as of today:  Health Maintenance Due   Topic Date Due     ANNUAL REVIEW OF HM ORDERS  Never done     PSA  05/22/2020     FALL RISK ASSESSMENT  05/22/2020     COVID-19 Vaccine (2 - Moderna 2-dose series) 04/26/2021     BPH - Dr Rutledge - failed flomax, secondary to dizziness, urinates every 3 hrs day/night, slow, but complete    GERD - no issues, occasional antacids    Lipids    Recent Labs   Lab Test 05/22/19  1033 12/21/16  0814 05/02/14  0910   CHOL 181 166 159   HDL 45 41 33*   * 99 100   TRIG 114 131 132   CHOLHDLRATIO  --   --  4.9     Dry Eye, r/o sjogren's, last 5 yrs    Health Maintenance     Colonoscopy:  Due 4/2024   FIT:  given              PSA:  pending   DEXA:  NA    Health Maintenance Due   Topic Date Due     ANNUAL REVIEW OF HM ORDERS  Never done     PSA  05/22/2020     FALL RISK ASSESSMENT  05/22/2020     COVID-19 Vaccine (2 - Moderna 2-dose series) 04/26/2021       Current Problem List    Patient Active Problem List   Diagnosis     CARDIOVASCULAR SCREENING; LDL GOAL LESS THAN 130     ACP (advance care planning)     GERD (gastroesophageal reflux disease)     BPH (benign prostatic hyperplasia)       Past Medical History    Past Medical History:   Diagnosis Date     BPH (benign prostatic hyperplasia)      CARDIOVASCULAR SCREENING; LDL GOAL LESS THAN 130      GERD (gastroesophageal reflux disease)       Mumps        Past Surgical History    Past Surgical History:   Procedure Laterality Date     HC COLONOSCOPY THRU STOMA, DIAGNOSTIC  12/04, 4/14    normal - Dr Valente - due 2024     HC REMOVE TONSILS/ADENOIDS,<11 Y/O  1958    T & A <13y.o.     HC REPAIR ING HERNIA,5+Y/O,REDUCIBL  1955    bilateral inguinal hernia     STRESS TEST  04/2018    normal - [place]     SURGICAL HISTORY OF -   8/05    Lt Knee tibial plateua fx - dr morales     SURGICAL HISTORY OF -   2009    Lt carpal tunnel - dr morales     SURGICAL HISTORY OF -   1/14    Rt carpal tunnel - dr morales       Current Medications    Current Outpatient Medications   Medication Sig Dispense Refill     ascorbic acid (VITAMIN C) 500 MG tablet Take 500 mg by mouth every other day  100 tablet 12     aspirin  MG EC tablet Take 325 mg by mouth every other day  90 tablet 3     calcium carbonate (OS-MICHELLE 500 MG [place]. [place]) 500 MG tablet Take 1 tablet by mouth every other day       cyanocobalamin (VITAMIN  B-12) 1000 MCG tablet Take 1,000 mcg by mouth every other day       FISH OIL 1000 MG OR CAPS 1 tablet by mouth every other day  0     GARLIC 100 MG OR TABS 1 tablet every other day  0     glucosamine-chondroitin 500-400 MG CAPS per capsule Take 1 capsule by mouth every other day       TURMERIC PO Take 1 tablet by mouth every other day         Allergies    No Known Allergies    Immunizations    Immunization History   Administered Date(s) Administered     COVID-19,PF,Moderna 03/29/2021     FLU 6-35 months 09/22/2010, 09/12/2012     Influenza (High Dose) 3 valent vaccine 09/07/2017, 10/01/2017, 09/11/2018, 09/09/2019     Influenza (IIV3) PF 11/25/2003, 10/17/2006, 10/01/2009, 09/22/2010, 09/09/2011, 09/19/2012, 10/03/2016     Influenza Vaccine IM > 6 months Valent IIV4 09/18/2013, 09/18/2014, 10/05/2015     Influenza Vaccine IM Ages 6-35 Months 4 Valent (PF) 10/01/2017     Influenza, Quad, High Dose, Pf, 65yr + 09/08/2020     Pneumo Conj 13-V (2010&after) 12/21/2016      Pneumococcal 23 valent 05/02/2014, 05/22/2019     TD (ADULT, 7+) 10/17/2003     TDAP Vaccine (Boostrix) 05/02/2012     Td (Adult), Adsorbed 10/17/2003     Twinrix A/B 10/17/2003, 11/25/2003, 04/16/2004     Typhoid IM 10/17/2003     Zoster vaccine recombinant adjuvanted (SHINGRIX) 05/04/2018, 07/23/2018     Zoster vaccine, live 10/05/2015       Family History    Family History   Problem Relation Age of Onset     Arthritis Mother      Heart Failure Mother      Neurologic Disorder Father         Parkinsons     Hypertension Father      Hypertension Brother      Lipids Brother      Pancreatic Cancer Brother      Heart Disease Maternal Grandfather      Seizure Disorder Brother        Social History    Social History     Socioeconomic History     Marital status:      Spouse name: Ariana     Number of children: 2     Years of education: 18     Highest education level: Not on file   Occupational History     Employer: SELF   Social Needs     Financial resource strain: Not on file     Food insecurity     Worry: Not on file     Inability: Not on file     Transportation needs     Medical: Not on file     Non-medical: Not on file   Tobacco Use     Smoking status: Never Smoker     Smokeless tobacco: Never Used   Substance and Sexual Activity     Alcohol use: No     Drug use: No     Sexual activity: Yes     Partners: Female   Lifestyle     Physical activity     Days per week: Not on file     Minutes per session: Not on file     Stress: Not on file   Relationships     Social connections     Talks on phone: Not on file     Gets together: Not on file     Attends Hindu service: Not on file     Active member of club or organization: Not on file     Attends meetings of clubs or organizations: Not on file     Relationship status: Not on file     Intimate partner violence     Fear of current or ex partner: Not on file     Emotionally abused: Not on file     Physically abused: Not on file     Forced sexual activity: Not on  "file   Other Topics Concern      Service Not Asked     Blood Transfusions Not Asked     Caffeine Concern Yes     Comment: 2 cups a day     Occupational Exposure Not Asked     Hobby Hazards Not Asked     Sleep Concern Not Asked     Stress Concern Not Asked     Weight Concern Not Asked     Special Diet Not Asked     Back Care Not Asked     Exercise Yes     Comment: bikes walks lifts wts 3-5 times per week - 10 miles per week     Bike Helmet Not Asked     Seat Belt Yes     Self-Exams Not Asked     Parent/sibling w/ CABG, MI or angioplasty before 65F 55M? No   Social History Narrative     Not on file       ROS    CONSTITUTIONAL: NEGATIVE for fever, chills, change in weight  INTEGUMENTARY/SKIN: NEGATIVE for worrisome rashes, moles or lesions  EYES: NEGATIVE for vision changes or irritation  ENT/MOUTH: NEGATIVE for ear, mouth and throat problems  RESP: NEGATIVE for significant cough or SOB  CV: NEGATIVE for chest pain, palpitations or peripheral edema  GI: NEGATIVE for nausea, abdominal pain, heartburn, or change in bowel habits  : NEGATIVE for frequency, dysuria, or hematuria  MUSCULOSKELETAL: NEGATIVE for significant arthralgias or myalgia  NEURO: NEGATIVE for weakness, dizziness or paresthesias  ENDOCRINE: NEGATIVE for temperature intolerance, skin/hair changes  HEME: NEGATIVE for bleeding problems  PSYCHIATRIC: NEGATIVE for changes in mood or affect    OBJECTIVE    /74   Pulse 68   Temp 98.2  F (36.8  C)   Resp 20   Ht 1.778 m (5' 10\")   Wt 80.3 kg (177 lb)   SpO2 99%   BMI 25.40 kg/m      EXAM:    GENERAL: healthy, alert and no distress  EYES: Eyes grossly normal to inspection, PERRL and conjunctivae and sclerae normal  HENT: ear canals and TM's normal, nose and mouth without ulcers or lesions  NECK: no adenopathy, no asymmetry, masses, or scars and thyroid normal to palpation  RESP: lungs clear to auscultation - no rales, rhonchi or wheezes  CV: regular rate and rhythm, normal S1 S2, no S3 or " S4, no murmur, click or rub, no peripheral edema and peripheral pulses strong  ABDOMEN: soft, nontender, no hepatosplenomegaly, no masses and bowel sounds normal   (male): testicles normal without atrophy or masses, no hernias and penis normal without urethral discharge  RECTAL: normal sphincter tone, no rectal masses, prostate of normal size for age, smooth, nontender without masses/nodules and prostate 1-2+ enlarged, nontender  MS: no gross musculoskeletal defects noted, no edema  SKIN: no suspicious lesions or rashes  NEURO: Normal strength and tone, mentation intact and speech normal  PSYCH: mentation appears normal, affect normal/bright  LYMPH: no cervical, supraclavicular, axillary, or inguinal adenopathy    DIAGNOSTICS/PROCEDURES    Pending    ASSESSMENT      ICD-10-CM    1. Routine general medical examination at a health care facility  Z00.00    2. Medicare annual wellness visit, subsequent  Z00.00 Comprehensive metabolic panel     Lipid panel reflex to direct LDL Fasting     CK total     CBC with platelets     TSH with free T4 reflex     UA reflex to Microscopic and Culture     Albumin Random Urine Quantitative with Creat Ratio   3. Dry eyes  H04.123 SSA Ro FELICIANO Antibody IgG     SSB La FELICIANO Antibody IgG   4. Gastroesophageal reflux disease without esophagitis  K21.9    5. CARDIOVASCULAR SCREENING; LDL GOAL LESS THAN 130  Z13.6    6. Benign prostatic hyperplasia with weak urinary stream  N40.1     R39.12    7. Screening for prostate cancer  Z12.5 Prostate spec antigen screen   8. Special screening for malignant neoplasms, colon  Z12.11 Fecal colorectal cancer screen FIT   9. Medication monitoring encounter  Z51.81        PLAN    Discussed treatment/modality options, including risk and benefits, he desires:    advised alcohol consumption 1oz per day or less, advised aspirin 81 mg po daily, advised 1 multivitamin per day, advised calcium 5276-2656 mg/d and Vitamin D 800-1200 IU/d, advised dentist every 6  "months, advised diet and exercise, advised opthalmologist every 1-2 years, advised self testicular exam q month, further health care maintenance, further lab(s), immunization(s) and observation    Discussed controversies surrounding PSA. Specifically reviewed 2017 USPSTF findings recommending discussion of PSA testing for men ages 55-69.  Reviewed findings of the  Randomized Study of Screening for Prostate Cancer which showed a 30% reduction in advanced stage prostate cancer and a 20% reduction in death rate from prostate cancer in this age group. PSA-based screening may prevent up to 2 deaths and up to 3 cases of metastatic disease per 1,000 men screened over 13 years.    We've elected to do PSA this year after discussing these controversies.    All diagnosis above reviewed and noted above, otherwise stable.      See [employer] orders for further details.      1) reviewed meds    2) labs pending    3) reviewed immunizations    4) Urology follow up prn    No follow-ups on file.    Health Maintenance Due   Topic Date Due     ANNUAL REVIEW OF  ORDERS  Never done     PSA  05/22/2020     FALL RISK ASSESSMENT  05/22/2020     COVID-19 Vaccine (2 - Moderna 2-dose series) 04/26/2021       COUNSELING    Reviewed preventive health counseling, as reflected in patient instructions    BP Readings from Last 1 Encounters:   05/24/21 128/74     Estimated body mass index is 25.4 kg/m  as calculated from the following:    Height as of this encounter: 1.778 m (5' 10\").    Weight as of this encounter: 80.3 kg (177 lb).           reports that he has never smoked. He has never used smokeless tobacco.      Counseling Resources:    ATP IV Guidelines  Pooled Cohorts Equation Calculator  FRAX Risk Assessment  ICSI Preventive Guidelines  Dietary Guidelines for Americans, 2010  USDA's MyPlate  ASA Prophylaxis  Lung CA Screening           Nitin Chopra MD, FAAWelia Health Geriatric Services  [address] [place] [address]  [email or web address]  [email or web address]   Office: [phone]  Fax: [phone]  Pager: [phone]

## 2021-05-24 NOTE — LETTER
[facility]  [address] [place] [address]  [phone]                    June 8, 2021    Ernie MALDONADO Quiring  [address] [facility] [address]      Dear Ernie,    Here is a summary of your recent test results:  Labs are overall quite good     We advise:     Continue current cares.   Balanced low cholesterol diet.   Regular exercise.     Your test results are enclosed.      Please contact me if you have any questions.           Thank you very much for trusting [facility].     Healthy regards,          Nitin Chopra M.D.        Results for orders placed or performed in visit on 05/24/21   Comprehensive metabolic panel     Status: None   Result Value Ref Range    Sodium 136 133 - 144 mmol/L    Potassium 4.3 3.4 - 5.3 mmol/L    Chloride 103 94 - 109 mmol/L    Carbon Dioxide 30 20 - 32 mmol/L    Anion Gap 3 3 - 14 mmol/L    Glucose 93 70 - 99 mg/dL    Urea Nitrogen 15 7 - 30 mg/dL    Creatinine 1.13 0.66 - 1.25 mg/dL    GFR Estimate 66 >60 mL/min/[1.73_m2]    GFR Estimate If Black 76 >60 mL/min/[1.73_m2]    Calcium 9.6 8.5 - 10.1 mg/dL    Bilirubin Total 0.6 0.2 - 1.3 mg/dL    Albumin 3.7 3.4 - 5.0 g/dL    Protein Total 7.7 6.8 - 8.8 g/dL    Alkaline Phosphatase 64 40 - 150 U/L    ALT 26 0 - 70 U/L    AST 38 0 - 45 U/L   Lipid panel reflex to direct LDL Fasting     Status: Abnormal   Result Value Ref Range    Cholesterol 168 <200 mg/dL    Triglycerides 113 <150 mg/dL    HDL Cholesterol 43 >39 mg/dL    LDL Cholesterol Calculated 102 (H) <100 mg/dL    Non HDL Cholesterol 125 <130 mg/dL   CK total     Status: None   Result Value Ref Range    CK Total 162 30 - 300 U/L   CBC with platelets     Status: None   Result Value Ref Range    WBC 5.9 4.0 - 11.0 10e9/L    RBC Count 5.00 4.4 - 5.9 10e12/L    Hemoglobin 14.4 13.3 - 17.7 g/dL    Hematocrit 43.2 40.0 - 53.0 %    MCV 86 78 - 100 fl    MCH 28.8 26.5 - 33.0 pg    MCHC 33.3 31.5 - 36.5 g/dL    RDW 12.4  10.0 - 15.0 %    Platelet Count 263 150 - 450 10e9/L   TSH with free T4 reflex     Status: None   Result Value Ref Range    TSH 1.32 0.40 - 4.00 mU/L   UA reflex to Microscopic and Culture     Status: None    Specimen: Midstream Urine   Result Value Ref Range    Color Urine Yellow     Appearance Urine Clear     Glucose Urine Negative NEG^Negative mg/dL    Bilirubin Urine Negative NEG^Negative    Ketones Urine Negative NEG^Negative mg/dL    Specific Gravity Urine 1.015 1.003 - 1.035    Blood Urine Negative NEG^Negative    pH Urine 7.0 5.0 - 7.0 pH    Protein Albumin Urine Negative NEG^Negative mg/dL    Urobilinogen Urine 0.2 0.2 - 1.0 EU/dL    Nitrite Urine Negative NEG^Negative    Leukocyte Esterase Urine Negative NEG^Negative    Source Midstream Urine    Albumin Random Urine Quantitative with Creat Ratio     Status: None   Result Value Ref Range    Creatinine Urine 63 mg/dL    Albumin Urine mg/L <5 mg/L    Albumin Urine mg/g Cr Unable to calculate due to low value 0 - 17 mg/g Cr   Prostate spec antigen screen     Status: None   Result Value Ref Range    PSA 0.79 0 - 4 ug/L   SSA Ro FELICIANO Antibody IgG     Status: None   Result Value Ref Range    SSA (Ro) (FELICIANO) Antibody, IgG <0.2 0.0 - 0.9 AI   SSB La FELICIANO Antibody IgG     Status: None   Result Value Ref Range    SSB (La) (FELICIANO) Antibody, IgG <0.2 0.0 - 0.9 AI

## 2021-05-25 DIAGNOSIS — Z12.11 SPECIAL SCREENING FOR MALIGNANT NEOPLASMS, COLON: ICD-10-CM

## 2021-05-25 LAB
CREAT UR-MCNC: 63 MG/DL
ENA SS-A IGG SER IA-ACNC: <0.2 AI (ref 0–0.9)
ENA SS-B IGG SER IA-ACNC: <0.2 AI (ref 0–0.9)
MICROALBUMIN UR-MCNC: <5 MG/L
MICROALBUMIN/CREAT UR: NORMAL MG/G CR (ref 0–17)

## 2021-05-25 PROCEDURE — 82274 ASSAY TEST FOR BLOOD FECAL: CPT | Performed by: FAMILY MEDICINE

## 2021-05-28 LAB — HEMOCCULT STL QL IA: NEGATIVE

## 2021-09-05 ENCOUNTER — HEALTH MAINTENANCE LETTER (OUTPATIENT)
Age: 71
End: 2021-09-05

## 2021-10-28 ENCOUNTER — TRANSFERRED RECORDS (OUTPATIENT)
Dept: HEALTH INFORMATION MANAGEMENT | Facility: CLINIC | Age: 71
End: 2021-10-28
Payer: COMMERCIAL

## 2021-11-01 NOTE — IP AVS SNAPSHOT
[place] [address] [facility]    [address]    [facility] [address]    Phone:  [phone]    Fax:  [phone]                                       After Visit Summary   8/5/2018    Ernie Landeros    MRN: [id number]           After Visit Summary Signature Page     I have received my discharge instructions, and my questions have been answered. I have discussed any challenges I see with this plan with the nurse or doctor.    ..........................................................................................................................................  Patient/Patient Representative Signature      ..........................................................................................................................................  Patient Representative Print Name and Relationship to Patient    ..................................................               ................................................  Date                                            Time    ..........................................................................................................................................  Reviewed by Signature/Title    ...................................................              ..............................................  Date                                                            Time           [address] Comprehensive

## 2022-07-18 ASSESSMENT — ENCOUNTER SYMPTOMS
MYALGIAS: 0
WEAKNESS: 0
CHILLS: 0
HEARTBURN: 0
DIZZINESS: 0
EYE PAIN: 0
ARTHRALGIAS: 0
DIARRHEA: 0
HEADACHES: 0
FEVER: 0
FREQUENCY: 0
NAUSEA: 0
JOINT SWELLING: 0
PARESTHESIAS: 0
HEMATURIA: 0
DYSURIA: 0
PALPITATIONS: 0
HEMATOCHEZIA: 0
COUGH: 0
CONSTIPATION: 0
SHORTNESS OF BREATH: 0
SORE THROAT: 0
NERVOUS/ANXIOUS: 0
ABDOMINAL PAIN: 0

## 2022-07-18 ASSESSMENT — ACTIVITIES OF DAILY LIVING (ADL): CURRENT_FUNCTION: NO ASSISTANCE NEEDED

## 2022-07-21 ENCOUNTER — OFFICE VISIT (OUTPATIENT)
Dept: FAMILY MEDICINE | Facility: CLINIC | Age: 72
End: 2022-07-21
Payer: COMMERCIAL

## 2022-07-21 VITALS
HEIGHT: 70 IN | TEMPERATURE: 96.4 F | BODY MASS INDEX: 25.48 KG/M2 | OXYGEN SATURATION: 96 % | DIASTOLIC BLOOD PRESSURE: 70 MMHG | SYSTOLIC BLOOD PRESSURE: 114 MMHG | RESPIRATION RATE: 18 BRPM | HEART RATE: 75 BPM | WEIGHT: 178 LBS

## 2022-07-21 DIAGNOSIS — R39.12 BENIGN PROSTATIC HYPERPLASIA WITH WEAK URINARY STREAM: ICD-10-CM

## 2022-07-21 DIAGNOSIS — H04.123 DRY EYES: ICD-10-CM

## 2022-07-21 DIAGNOSIS — Z00.00 ROUTINE GENERAL MEDICAL EXAMINATION AT A HEALTH CARE FACILITY: Primary | ICD-10-CM

## 2022-07-21 DIAGNOSIS — Z80.0 FAMILY HISTORY OF COLON CANCER: ICD-10-CM

## 2022-07-21 DIAGNOSIS — Z12.11 SCREEN FOR COLON CANCER: ICD-10-CM

## 2022-07-21 DIAGNOSIS — Z51.81 MEDICATION MONITORING ENCOUNTER: ICD-10-CM

## 2022-07-21 DIAGNOSIS — Z12.5 SCREENING FOR PROSTATE CANCER: ICD-10-CM

## 2022-07-21 DIAGNOSIS — Z13.6 CARDIOVASCULAR SCREENING; LDL GOAL LESS THAN 130: ICD-10-CM

## 2022-07-21 DIAGNOSIS — R23.9 UNSPECIFIED SKIN CHANGES: ICD-10-CM

## 2022-07-21 DIAGNOSIS — N40.1 BENIGN PROSTATIC HYPERPLASIA WITH WEAK URINARY STREAM: ICD-10-CM

## 2022-07-21 DIAGNOSIS — K21.9 GASTROESOPHAGEAL REFLUX DISEASE WITHOUT ESOPHAGITIS: ICD-10-CM

## 2022-07-21 LAB
ALBUMIN UR-MCNC: NEGATIVE MG/DL
APPEARANCE UR: CLEAR
BILIRUB UR QL STRIP: NEGATIVE
COLOR UR AUTO: YELLOW
ERYTHROCYTE [DISTWIDTH] IN BLOOD BY AUTOMATED COUNT: 12.8 % (ref 10–15)
GLUCOSE UR STRIP-MCNC: NEGATIVE MG/DL
HCT VFR BLD AUTO: 41.6 % (ref 40–53)
HGB BLD-MCNC: 14.1 G/DL (ref 13.3–17.7)
HGB UR QL STRIP: NEGATIVE
KETONES UR STRIP-MCNC: NEGATIVE MG/DL
LEUKOCYTE ESTERASE UR QL STRIP: NEGATIVE
MCH RBC QN AUTO: 28.7 PG (ref 26.5–33)
MCHC RBC AUTO-ENTMCNC: 33.9 G/DL (ref 31.5–36.5)
MCV RBC AUTO: 85 FL (ref 78–100)
NITRATE UR QL: NEGATIVE
PH UR STRIP: 7.5 [PH] (ref 5–7)
PLATELET # BLD AUTO: 254 10E3/UL (ref 150–450)
RBC # BLD AUTO: 4.91 10E6/UL (ref 4.4–5.9)
SP GR UR STRIP: 1.02 (ref 1–1.03)
UROBILINOGEN UR STRIP-ACNC: 0.2 E.U./DL
WBC # BLD AUTO: 5.8 10E3/UL (ref 4–11)

## 2022-07-21 PROCEDURE — G0103 PSA SCREENING: HCPCS | Performed by: FAMILY MEDICINE

## 2022-07-21 PROCEDURE — 99213 OFFICE O/P EST LOW 20 MIN: CPT | Mod: 25 | Performed by: FAMILY MEDICINE

## 2022-07-21 PROCEDURE — 85027 COMPLETE CBC AUTOMATED: CPT | Performed by: FAMILY MEDICINE

## 2022-07-21 PROCEDURE — G0439 PPPS, SUBSEQ VISIT: HCPCS | Performed by: FAMILY MEDICINE

## 2022-07-21 PROCEDURE — 82550 ASSAY OF CK (CPK): CPT | Performed by: FAMILY MEDICINE

## 2022-07-21 PROCEDURE — 80053 COMPREHEN METABOLIC PANEL: CPT | Performed by: FAMILY MEDICINE

## 2022-07-21 PROCEDURE — 82043 UR ALBUMIN QUANTITATIVE: CPT | Performed by: FAMILY MEDICINE

## 2022-07-21 PROCEDURE — 84443 ASSAY THYROID STIM HORMONE: CPT | Performed by: FAMILY MEDICINE

## 2022-07-21 PROCEDURE — 80061 LIPID PANEL: CPT | Performed by: FAMILY MEDICINE

## 2022-07-21 PROCEDURE — 36415 COLL VENOUS BLD VENIPUNCTURE: CPT | Performed by: FAMILY MEDICINE

## 2022-07-21 PROCEDURE — 81003 URINALYSIS AUTO W/O SCOPE: CPT | Performed by: FAMILY MEDICINE

## 2022-07-21 ASSESSMENT — ENCOUNTER SYMPTOMS
ARTHRALGIAS: 0
DIZZINESS: 0
HEARTBURN: 0
COUGH: 0
MYALGIAS: 0
CHILLS: 0
NERVOUS/ANXIOUS: 0
DIARRHEA: 0
EYE PAIN: 0
DYSURIA: 0
PALPITATIONS: 0
HEMATURIA: 0
HEADACHES: 0
JOINT SWELLING: 0
HEMATOCHEZIA: 0
WEAKNESS: 0
SORE THROAT: 0
FREQUENCY: 0
PARESTHESIAS: 0
FEVER: 0
CONSTIPATION: 0
ABDOMINAL PAIN: 0
NAUSEA: 0
SHORTNESS OF BREATH: 0

## 2022-07-21 ASSESSMENT — ACTIVITIES OF DAILY LIVING (ADL): CURRENT_FUNCTION: NO ASSISTANCE NEEDED

## 2022-07-21 NOTE — LETTER
July 26, 2022      Enrique JOEL Tigre  [address] [place] [id number]        Dear ,    We are writing to inform you of your test results.    Your recent results have been reviewed.     They showed:     Labs are overall quite good, except     Slightly low HDL (good cholesterol)     We advise:     Continue current cares.   Balanced low cholesterol diet.   Regular exercise.     For additional lab test information, [email or web address] is an excellent reference.       Resulted Orders   Comprehensive metabolic panel   Result Value Ref Range    Sodium 137 133 - 144 mmol/L    Potassium 4.5 3.4 - 5.3 mmol/L    Chloride 106 94 - 109 mmol/L    Carbon Dioxide (CO2) 23 20 - 32 mmol/L    Anion Gap 8 3 - 14 mmol/L    Urea Nitrogen 21 7 - 30 mg/dL    Creatinine 1.08 0.66 - 1.25 mg/dL    Calcium 9.2 8.5 - 10.1 mg/dL    Glucose 86 70 - 99 mg/dL    Alkaline Phosphatase 53 40 - 150 U/L    AST 36 0 - 45 U/L    ALT 25 0 - 70 U/L    Protein Total 6.9 6.8 - 8.8 g/dL    Albumin 3.8 3.4 - 5.0 g/dL    Bilirubin Total 0.7 0.2 - 1.3 mg/dL    GFR Estimate 74 >60 mL/min/1.73m2      Comment:      Effective December 21, 2021 eGFRcr in adults is calculated using the 2021 CKD-EPI creatinine equation which includes age and gender (Linwood harvey al., NEJ, DOI: 10.1056/KSFLjj4126209)   Lipid panel reflex to direct LDL Fasting   Result Value Ref Range    Cholesterol 165 <200 mg/dL    Triglycerides 149 <150 mg/dL    Direct Measure HDL 38 (L) >=40 mg/dL    LDL Cholesterol Calculated 97 <=100 mg/dL    Non HDL Cholesterol 127 <130 mg/dL    Patient Fasting > 8hrs? Yes     Narrative    Cholesterol  Desirable:  <200 mg/dL    Triglycerides  Normal:  Less than 150 mg/dL  Borderline High:  150-199 mg/dL  High:  200-499 mg/dL  Very High:  Greater than or equal to 500 mg/dL    Direct Measure HDL  Female:  Greater than or equal to 50 mg/dL   Male:  Greater than or equal to 40 mg/dL    LDL Cholesterol  Desirable:  <100mg/dL  Above Desirable:  100-129  mg/dL   Borderline High:  130-159 mg/dL   High:  160-189 mg/dL   Very High:  >= 190 mg/dL    Non HDL Cholesterol  Desirable:  130 mg/dL  Above Desirable:  130-159 mg/dL  Borderline High:  160-189 mg/dL  High:  190-219 mg/dL  Very High:  Greater than or equal to 220 mg/dL   CBC with platelets   Result Value Ref Range    WBC Count 5.8 4.0 - 11.0 10e3/uL    RBC Count 4.91 4.40 - 5.90 10e6/uL    Hemoglobin 14.1 13.3 - 17.7 g/dL    Hematocrit 41.6 40.0 - 53.0 %    MCV 85 78 - 100 fL    MCH 28.7 26.5 - 33.0 pg    MCHC 33.9 31.5 - 36.5 g/dL    RDW 12.8 10.0 - 15.0 %    Platelet Count 254 150 - 450 10e3/uL   CK total   Result Value Ref Range     30 - 300 U/L   UA reflex to Microscopic and Culture   Result Value Ref Range    Color Urine Yellow Colorless, Straw, Light Yellow, Yellow    Appearance Urine Clear Clear    Glucose Urine Negative Negative mg/dL    Bilirubin Urine Negative Negative    Ketones Urine Negative Negative mg/dL    Specific Gravity Urine 1.020 1.003 - 1.035    Blood Urine Negative Negative    pH Urine 7.5 (H) 5.0 - 7.0    Protein Albumin Urine Negative Negative mg/dL    Urobilinogen Urine 0.2 0.2, 1.0 E.U./dL    Nitrite Urine Negative Negative    Leukocyte Esterase Urine Negative Negative    Narrative    Microscopic not indicated   Albumin Random Urine Quantitative with Creat Ratio   Result Value Ref Range    Creatinine Urine mg/dL 213 mg/dL    Albumin Urine mg/L 9 mg/L    Albumin Urine mg/g Cr 4.23 0.00 - 17.00 mg/g Cr   TSH with free T4 reflex   Result Value Ref Range    TSH 1.38 0.40 - 4.00 mU/L   Prostate Specific Antigen Screen   Result Value Ref Range    Prostate Specific Antigen Screen 0.70 0.00 - 4.00 ug/L       If you have any questions or concerns, please call the clinic at the number listed above.       Sincerely,      Nitin Chopra MD

## 2022-07-21 NOTE — PROGRESS NOTES
"[facility]    JEFF Landeros is a 71 year old male who presents for Preventive Visit.    Patient has been advised of split billing requirements and indicates understanding: Yes     Are you in the first 12 months of your Medicare coverage?  No    Healthy Habits:     In general, how would you rate your overall health?  Excellent    Frequency of exercise:  4-5 days/week    Duration of exercise:  15-30 minutes    Do you usually eat at least 4 servings of fruit and vegetables a day, include whole grains    & fiber and avoid regularly eating high fat or \"junk\" foods?  Yes    Taking medications regularly:  Yes    Medication side effects:  Not applicable    Ability to successfully perform activities of daily living:  No assistance needed    Home Safety:  No safety concerns identified    Hearing Impairment:  No hearing concerns    In the past 6 months, have you been bothered by leaking of urine?  No    In general, how would you rate your overall mental or emotional health?  Excellent      PHQ-2 Total Score: 0    Additional concerns today:  Yes    Do you feel safe in your environment? Yes    Have you ever done Advance Care Planning? (For example, a Health Directive, POLST, or a discussion with a medical provider or your loved ones about your wishes): Yes, advance care planning is on file.    Fall risk  Fallen 2 or more times in the past year?: No  Any fall with injury in the past year?: No    Cognitive Screening   1) Repeat 3 items (Leader, Season, Table)    2) Clock draw: NORMAL  3) 3 item recall: Recalls 3 objects  Results: 3 items recalled: COGNITIVE IMPAIRMENT LESS LIKELY    Mini-CogTM Copyright ARNOLDO Sorenson. Licensed by the author for use in [facility]; reprinted with permission ([email or web address]). All rights reserved.      Do you have sleep apnea, excessive snoring or daytime drowsiness?: no    Reviewed and updated as needed this visit by clinical staff   Tobacco  Allergies  " Meds   Med Hx  Surg Hx  Fam Hx  Soc Hx        Reviewed and updated as needed this visit by Provider                   Social History     Tobacco Use     Smoking status: Never Smoker     Smokeless tobacco: Never Used   Substance Use Topics     Alcohol use: No     If you drink alcohol do you typically have >3 drinks per day or >7 drinks per week? No    Alcohol Use 7/21/2022   Prescreen: >3 drinks/day or >7 drinks/week? -   Prescreen: >3 drinks/day or >7 drinks/week? No     Current providers sharing in care for this patient include:     Patient Care Team:  Nitin Chopra MD as PCP - General  Nitin Chopra MD as Assigned PCP    The following health maintenance items are reviewed in Epic and correct as of today:    Health Maintenance Due   Topic Date Due     COVID-19 Vaccine (4 - Booster for Moderna series) 03/08/2022     DTAP/TDAP/TD IMMUNIZATION (3 - Td or Tdap) 05/02/2022     PSA  05/24/2022     Review of Systems   Constitutional: Negative for chills and fever.   HENT: Negative for congestion, ear pain, hearing loss and sore throat.    Eyes: Negative for pain and visual disturbance.   Respiratory: Negative for cough and shortness of breath.    Cardiovascular: Negative for chest pain, palpitations and peripheral edema.   Gastrointestinal: Negative for abdominal pain, constipation, diarrhea, heartburn, hematochezia and nausea.   Genitourinary: Negative for dysuria, frequency, genital sores, hematuria, impotence, penile discharge and urgency.   Musculoskeletal: Negative for arthralgias, joint swelling and myalgias.   Skin: Negative for rash.   Neurological: Negative for dizziness, weakness, headaches and paresthesias.   Psychiatric/Behavioral: Negative for mood changes. The patient is not nervous/anxious.      Lipids    Recent Labs   Lab Test 05/24/21  1030 05/22/19  1033   CHOL 168 181   HDL 43 45   * 113*   TRIG 113 114     GERD - no issues, unless diet issues, then Tums    BPH - doing ok    PSA   Date Value  Ref Range Status   05/24/2021 0.79 0 - 4 ug/L Final     Comment:     Assay Method:  Chemiluminescence using Siemens Vista analyzer       Health Maintenance     Colonoscopy:  Due   FIT:  NA              PSA:  pending   DEXA:  NA    Health Maintenance Due   Topic Date Due     COVID-19 Vaccine (4 - Booster for Moderna series) 03/08/2022     DTAP/TDAP/TD IMMUNIZATION (3 - Td or Tdap) 05/02/2022     PSA  05/24/2022       Current Problem List    Patient Active Problem List   Diagnosis     CARDIOVASCULAR SCREENING; LDL GOAL LESS THAN 130     ACP (advance care planning)     GERD (gastroesophageal reflux disease)     BPH (benign prostatic hyperplasia)       Past Medical History    Past Medical History:   Diagnosis Date     BPH (benign prostatic hyperplasia)      CARDIOVASCULAR SCREENING; LDL GOAL LESS THAN 130      GERD (gastroesophageal reflux disease)      Mumps        Past Surgical History    Past Surgical History:   Procedure Laterality Date     HC REMOVE TONSILS/ADENOIDS,<13 Y/O  1958    T & A <13y.o.     HC REPAIR ING HERNIA,5+Y/O,REDUCIBL  1955    bilateral inguinal hernia     STRESS TEST  04/2018    normal - [place]     SURGICAL HISTORY OF -   8/05    Lt Knee tibial plateua fx - dr morales     SURGICAL HISTORY OF -   2009    Lt carpal tunnel - dr morales     SURGICAL HISTORY OF -   1/14    Rt carpal tunnel - dr morales     [facility] COLONOSCOPY THRU STOMA, DIAGNOSTIC  12/04, 4/14    normal - Dr Valente - due 2024       Current Medications    Current Outpatient Medications   Medication Sig Dispense Refill     ascorbic acid (VITAMIN C) 500 MG tablet Take 500 mg by mouth every other day  100 tablet 12     aspirin  MG EC tablet Take 325 mg by mouth every other day  90 tablet 3     calcium carbonate (OS-MICHELLE 500 MG [place]. [place]) 500 MG tablet Take 1 tablet by mouth every other day       cyanocobalamin (VITAMIN  B-12) 1000 MCG tablet Take 1,000 mcg by mouth every other day       FISH OIL 1000 MG OR CAPS 1 tablet by mouth every  other day  0     GARLIC 100 MG OR TABS 1 tablet every other day  0     glucosamine-chondroitin 500-400 MG CAPS per capsule Take 1 capsule by mouth every other day       TURMERIC PO Take 1 tablet by mouth every other day         Allergies    No Known Allergies    Immunizations    Immunization History   Administered Date(s) Administered     COVID-19,PF,Moderna 03/01/2021, 03/29/2021     COVID-19,PF,Moderna Booster 11/08/2021     FLU 6-35 months 09/22/2010, 09/12/2012     Influenza (High Dose) 3 valent vaccine 09/07/2017, 10/01/2017, 09/11/2018, 09/09/2019     Influenza (IIV3) PF 11/25/2003, 10/17/2006, 10/01/2009, 09/22/2010, 09/09/2011, 09/19/2012, 10/03/2016     Influenza Vaccine IM > 6 months Valent IIV4 (Alfuria,Fluzone) 09/18/2013, 09/18/2014, 10/05/2015     Influenza Vaccine IM Ages 6-35 Months 4 Valent (PF) 10/01/2017     Influenza, Quad, High Dose, Pf, 65yr+ (Fluzone HD) 09/08/2020, 09/07/2021     Pneumo Conj 13-V (2010&after) 12/21/2016     Pneumococcal 23 valent 05/02/2014, 05/22/2019     TD (ADULT, 7+) 10/17/2003     TDAP Vaccine (Boostrix) 05/02/2012     Td (Adult), Adsorbed 10/17/2003     Twinrix A/B 10/17/2003, 11/25/2003, 04/16/2004     Typhoid IM 10/17/2003     Zoster vaccine recombinant adjuvanted (SHINGRIX) 05/04/2018, 07/23/2018     Zoster vaccine, live 10/05/2015       Family History    Family History   Problem Relation Age of Onset     Arthritis Mother      Heart Failure Mother      Neurologic Disorder Father         Parkinsons     Hypertension Father      Hypertension Brother      Lipids Brother      Pancreatic Cancer Brother      Colon Cancer Brother      Seizure Disorder Brother      Other - See Comments Brother         hit by car at age 4     Heart Disease Maternal Grandfather        Social History    Social History     Socioeconomic History     Marital status:      Spouse name: Ariana     Number of children: 2     Years of education: 18     Highest education level: Not on file    Occupational History     Employer: SELF   Tobacco Use     Smoking status: Never Smoker     Smokeless tobacco: Never Used   Vaping Use     Vaping Use: Never used   Substance and Sexual Activity     Alcohol use: No     Drug use: No     Sexual activity: Yes     Partners: Male     Birth control/protection: None   Other Topics Concern      Service Not Asked     Blood Transfusions Not Asked     Caffeine Concern Yes     Comment: 2 cups a day     Occupational Exposure Not Asked     Hobby Hazards Not Asked     Sleep Concern Not Asked     Stress Concern Not Asked     Weight Concern Not Asked     Special Diet Not Asked     Back Care Not Asked     Exercise Yes     Comment: bikes walks lifts wts 3-5 times per week - 10 miles per week     Bike Helmet Not Asked     Seat Belt Yes     Self-Exams Not Asked     Parent/sibling w/ CABG, MI or angioplasty before 65F 55M? No   Social History Narrative     Not on file     Social Determinants of Health     Financial Resource Strain: Not on file   Food Insecurity: Not on file   Transportation Needs: Not on file   Physical Activity: Not on file   Stress: Not on file   Social Connections: Not on file   Intimate Partner Violence: Not on file   Housing Stability: Not on file       ROS    CONSTITUTIONAL: NEGATIVE for fever, chills, change in weight  INTEGUMENTARY/SKIN: NEGATIVE for worrisome rashes, moles or lesions  EYES: NEGATIVE for vision changes or irritation  ENT/MOUTH: NEGATIVE for ear, mouth and throat problems  RESP: NEGATIVE for significant cough or SOB  BREAST: NEGATIVE for masses, tenderness or discharge  CV: NEGATIVE for chest pain, palpitations or peripheral edema  GI: NEGATIVE for nausea, abdominal pain, heartburn, or change in bowel habits  : NEGATIVE for frequency, dysuria, or hematuria  MUSCULOSKELETAL: NEGATIVE for significant arthralgias or myalgia  NEURO: NEGATIVE for weakness, dizziness or paresthesias  ENDOCRINE: NEGATIVE for temperature intolerance, skin/hair  "changes  HEME: NEGATIVE for bleeding problems  PSYCHIATRIC: NEGATIVE for changes in mood or affect    OBJECTIVE    /70   Pulse 75   Temp (!) 96.4  F (35.8  C) (Tympanic)   Resp 18   Ht 1.778 m (5' 10\")   Wt 80.7 kg (178 lb)   SpO2 96%   BMI 25.54 kg/m      EXAM:    GENERAL: healthy, alert and no distress  EYES: Eyes grossly normal to inspection, PERRL and conjunctivae and sclerae normal  HENT: ear canals and TM's normal, nose and mouth without ulcers or lesions  NECK: no adenopathy, no asymmetry, masses, or scars and thyroid normal to palpation  RESP: lungs clear to auscultation - no rales, rhonchi or wheezes  CV: regular rate and rhythm, normal S1 S2, no S3 or S4, no murmur, click or rub, no peripheral edema and peripheral pulses strong  ABDOMEN: soft, nontender, no hepatosplenomegaly, no masses and bowel sounds normal   (male): testicles normal without atrophy or masses, no hernias and penis normal without urethral discharge  RECTAL: normal sphincter tone, no rectal masses and prostate of normal size for age, smooth, nontender without masses/nodules  MS: no gross musculoskeletal defects noted, no edema  SKIN: no suspicious lesions or rashes  NEURO: Normal strength and tone, mentation intact and speech normal  PSYCH: mentation appears normal, affect normal/bright  LYMPH: no cervical, supraclavicular, axillary, or inguinal adenopathy    DIAGNOSTICS/PROCEDURES    Pending    ASSESSMENT      ICD-10-CM    1. Routine general medical examination at a health care facility  Z00.00 Comprehensive metabolic panel     Lipid panel reflex to direct LDL Fasting     CBC with platelets     CK total     UA reflex to Microscopic and Culture     Albumin Random Urine Quantitative with Creat Ratio     TSH with free T4 reflex     Prostate Specific Antigen Screen     REVIEW OF HEALTH MAINTENANCE PROTOCOL ORDERS     Adult GI  Referral - Procedure Only     Adult Dermatology Referral   2. CARDIOVASCULAR SCREENING; LDL " GOAL LESS THAN 130  Z13.6 Comprehensive metabolic panel     Lipid panel reflex to direct LDL Fasting     CK total     TSH with free T4 reflex     REVIEW OF HEALTH MAINTENANCE PROTOCOL ORDERS   3. Gastroesophageal reflux disease without esophagitis  K21.9 CBC with platelets     TSH with free T4 reflex     REVIEW OF HEALTH MAINTENANCE PROTOCOL ORDERS   4. Dry eyes  H04.123 TSH with free T4 reflex     REVIEW OF HEALTH MAINTENANCE PROTOCOL ORDERS   5. Benign prostatic hyperplasia with weak urinary stream  N40.1 Prostate Specific Antigen Screen    R39.12 REVIEW OF HEALTH MAINTENANCE PROTOCOL ORDERS   6. Unspecified skin changes   R23.9 TSH with free T4 reflex   7. Family history of colon cancer  Z80.0 REVIEW OF HEALTH MAINTENANCE PROTOCOL ORDERS   8. Screen for colon cancer  Z12.11 REVIEW OF HEALTH MAINTENANCE PROTOCOL ORDERS   9. Screening for prostate cancer  Z12.5 Prostate Specific Antigen Screen     REVIEW OF HEALTH MAINTENANCE PROTOCOL ORDERS   10. Medication monitoring encounter  Z51.81 Comprehensive metabolic panel     Lipid panel reflex to direct LDL Fasting     CBC with platelets     CK total     UA reflex to Microscopic and Culture     Albumin Random Urine Quantitative with Creat Ratio     TSH with free T4 reflex     REVIEW OF HEALTH MAINTENANCE PROTOCOL ORDERS       PLAN    Discussed treatment/modality options, including risk and benefits, he desires:    advised alcohol consumption 1oz per day or less, advised aspirin 81 mg po daily, advised 1 multivitamin per day, advised calcium 4219-3316 mg/d and Vitamin D 800-1200 IU/d, advised dentist every 6 months, advised diet and exercise, advised opthalmologist every 1-2 years, advised self testicular exam q month, further health care maintenance, further lab(s), immunization(s) and observation    Discussed controversies surrounding PSA. Specifically reviewed 2017 USPSTF findings recommending discussion of PSA testing for men ages 55-69.  Reviewed findings of the  " Randomized Study of Screening for Prostate Cancer which showed a 30% reduction in advanced stage prostate cancer and a 20% reduction in death rate from prostate cancer in this age group. PSA-based screening may prevent up to 2 deaths and up to 3 cases of metastatic disease per 1,000 men screened over 13 years.    We've elected to do PSA this year after discussing these controversies.    All diagnosis above reviewed and noted above, otherwise stable.      See [facility] orders for further details.      1) labs pending    2) immunizations reviewed - Moderna, TdaP    3) colonoscopy    Return in about 1 year (around 7/21/2023) for Complete Physical, Follow Up Chronic.    Health Maintenance Due   Topic Date Due     COVID-19 Vaccine (4 - Booster for Moderna series) 03/08/2022     DTAP/TDAP/TD IMMUNIZATION (3 - Td or Tdap) 05/02/2022     PSA  05/24/2022       COUNSELING    Reviewed preventive health counseling, as reflected in patient instructions    BP Readings from Last 1 Encounters:   07/21/22 114/70     Estimated body mass index is 25.54 kg/m  as calculated from the following:    Height as of this encounter: 1.778 m (5' 10\").    Weight as of this encounter: 80.7 kg (178 lb).     reports that he has never smoked. He has never used smokeless tobacco.    Counseling Resources:    ATP IV Guidelines  Pooled Cohorts Equation Calculator  FRAX Risk Assessment  ICSI Preventive Guidelines  Dietary Guidelines for Americans, 2010  USDA's MyPlate  ASA Prophylaxis  Lung CA Screening         Nitin Chopra MD, FAAFP     [facility]  [address] [place] [id number]  [email or web address]   Office: [phone]  Fax: [phone]  Pager: [phone]     Identified Health Risks:  "

## 2022-07-22 LAB
ALBUMIN SERPL-MCNC: 3.8 G/DL (ref 3.4–5)
ALP SERPL-CCNC: 53 U/L (ref 40–150)
ALT SERPL W P-5'-P-CCNC: 25 U/L (ref 0–70)
ANION GAP SERPL CALCULATED.3IONS-SCNC: 8 MMOL/L (ref 3–14)
AST SERPL W P-5'-P-CCNC: 36 U/L (ref 0–45)
BILIRUB SERPL-MCNC: 0.7 MG/DL (ref 0.2–1.3)
BUN SERPL-MCNC: 21 MG/DL (ref 7–30)
CALCIUM SERPL-MCNC: 9.2 MG/DL (ref 8.5–10.1)
CHLORIDE BLD-SCNC: 106 MMOL/L (ref 94–109)
CHOLEST SERPL-MCNC: 165 MG/DL
CK SERPL-CCNC: 157 U/L (ref 30–300)
CO2 SERPL-SCNC: 23 MMOL/L (ref 20–32)
CREAT SERPL-MCNC: 1.08 MG/DL (ref 0.66–1.25)
CREAT UR-MCNC: 213 MG/DL
FASTING STATUS PATIENT QL REPORTED: YES
GFR SERPL CREATININE-BSD FRML MDRD: 74 ML/MIN/1.73M2
GLUCOSE BLD-MCNC: 86 MG/DL (ref 70–99)
HDLC SERPL-MCNC: 38 MG/DL
LDLC SERPL CALC-MCNC: 97 MG/DL
MICROALBUMIN UR-MCNC: 9 MG/L
MICROALBUMIN/CREAT UR: 4.23 MG/G CR (ref 0–17)
NONHDLC SERPL-MCNC: 127 MG/DL
POTASSIUM BLD-SCNC: 4.5 MMOL/L (ref 3.4–5.3)
PROT SERPL-MCNC: 6.9 G/DL (ref 6.8–8.8)
SODIUM SERPL-SCNC: 137 MMOL/L (ref 133–144)
TRIGL SERPL-MCNC: 149 MG/DL
TSH SERPL DL<=0.005 MIU/L-ACNC: 1.38 MU/L (ref 0.4–4)

## 2022-07-23 LAB — PSA SERPL-MCNC: 0.7 UG/L (ref 0–4)

## 2022-08-03 RX ORDER — BISACODYL 5 MG
TABLET, DELAYED RELEASE (ENTERIC COATED) ORAL
Qty: 4 TABLET | Refills: 0 | Status: SHIPPED | OUTPATIENT
Start: 2022-08-03 | End: 2023-07-25

## 2022-08-19 ENCOUNTER — HOSPITAL ENCOUNTER (OUTPATIENT)
Facility: CLINIC | Age: 72
Discharge: HOME | End: 2022-08-19
Attending: INTERNAL MEDICINE | Admitting: INTERNAL MEDICINE
Payer: COMMERCIAL

## 2022-08-19 VITALS
TEMPERATURE: 98.3 F | WEIGHT: 170 LBS | SYSTOLIC BLOOD PRESSURE: 114 MMHG | HEART RATE: 77 BPM | HEIGHT: 69 IN | RESPIRATION RATE: 16 BRPM | DIASTOLIC BLOOD PRESSURE: 86 MMHG | BODY MASS INDEX: 25.18 KG/M2 | OXYGEN SATURATION: 94 %

## 2022-08-19 DIAGNOSIS — Z12.11 COLON CANCER SCREENING: Primary | ICD-10-CM

## 2022-08-19 LAB — COLONOSCOPY: NORMAL

## 2022-08-19 PROCEDURE — G0121 COLON CA SCRN NOT HI RSK IND: HCPCS | Performed by: INTERNAL MEDICINE

## 2022-08-19 PROCEDURE — G0500 MOD SEDAT ENDO SERVICE >5YRS: HCPCS | Performed by: INTERNAL MEDICINE

## 2022-08-19 PROCEDURE — 45378 DIAGNOSTIC COLONOSCOPY: CPT | Performed by: INTERNAL MEDICINE

## 2022-08-19 PROCEDURE — 250N000011 HC RX IP 250 OP 636: Performed by: INTERNAL MEDICINE

## 2022-08-19 RX ORDER — EPINEPHRINE 1 MG/ML
0.1 INJECTION, SOLUTION INTRAMUSCULAR; SUBCUTANEOUS
Status: DISCONTINUED | OUTPATIENT
Start: 2022-08-19 | End: 2022-08-19 | Stop reason: HOSPADM

## 2022-08-19 RX ORDER — NALOXONE HYDROCHLORIDE 0.4 MG/ML
0.4 INJECTION, SOLUTION INTRAMUSCULAR; INTRAVENOUS; SUBCUTANEOUS
Status: DISCONTINUED | OUTPATIENT
Start: 2022-08-19 | End: 2022-08-19 | Stop reason: HOSPADM

## 2022-08-19 RX ORDER — LIDOCAINE 40 MG/G
CREAM TOPICAL
Status: DISCONTINUED | OUTPATIENT
Start: 2022-08-19 | End: 2022-08-19 | Stop reason: HOSPADM

## 2022-08-19 RX ORDER — ONDANSETRON 2 MG/ML
4 INJECTION INTRAMUSCULAR; INTRAVENOUS
Status: DISCONTINUED | OUTPATIENT
Start: 2022-08-19 | End: 2022-08-19 | Stop reason: HOSPADM

## 2022-08-19 RX ORDER — PROCHLORPERAZINE MALEATE 5 MG
5 TABLET ORAL EVERY 6 HOURS PRN
Status: DISCONTINUED | OUTPATIENT
Start: 2022-08-19 | End: 2022-08-19 | Stop reason: HOSPADM

## 2022-08-19 RX ORDER — SIMETHICONE 40MG/0.6ML
133 SUSPENSION, DROPS(FINAL DOSAGE FORM)(ML) ORAL
Status: DISCONTINUED | OUTPATIENT
Start: 2022-08-19 | End: 2022-08-19 | Stop reason: HOSPADM

## 2022-08-19 RX ORDER — FENTANYL CITRATE 0.05 MG/ML
50-100 INJECTION, SOLUTION INTRAMUSCULAR; INTRAVENOUS EVERY 5 MIN PRN
Status: DISCONTINUED | OUTPATIENT
Start: 2022-08-19 | End: 2022-08-19 | Stop reason: HOSPADM

## 2022-08-19 RX ORDER — FLUMAZENIL 0.1 MG/ML
0.2 INJECTION, SOLUTION INTRAVENOUS
Status: DISCONTINUED | OUTPATIENT
Start: 2022-08-19 | End: 2022-08-19 | Stop reason: HOSPADM

## 2022-08-19 RX ORDER — NALOXONE HYDROCHLORIDE 0.4 MG/ML
0.2 INJECTION, SOLUTION INTRAMUSCULAR; INTRAVENOUS; SUBCUTANEOUS
Status: DISCONTINUED | OUTPATIENT
Start: 2022-08-19 | End: 2022-08-19 | Stop reason: HOSPADM

## 2022-08-19 RX ORDER — ATROPINE SULFATE 0.1 MG/ML
1 INJECTION INTRAVENOUS
Status: DISCONTINUED | OUTPATIENT
Start: 2022-08-19 | End: 2022-08-19 | Stop reason: HOSPADM

## 2022-08-19 RX ORDER — DIPHENHYDRAMINE HYDROCHLORIDE 50 MG/ML
25-50 INJECTION INTRAMUSCULAR; INTRAVENOUS
Status: DISCONTINUED | OUTPATIENT
Start: 2022-08-19 | End: 2022-08-19 | Stop reason: HOSPADM

## 2022-08-19 RX ORDER — ONDANSETRON 4 MG/1
4 TABLET, ORALLY DISINTEGRATING ORAL EVERY 6 HOURS PRN
Status: DISCONTINUED | OUTPATIENT
Start: 2022-08-19 | End: 2022-08-19 | Stop reason: HOSPADM

## 2022-08-19 RX ORDER — ONDANSETRON 2 MG/ML
4 INJECTION INTRAMUSCULAR; INTRAVENOUS EVERY 6 HOURS PRN
Status: DISCONTINUED | OUTPATIENT
Start: 2022-08-19 | End: 2022-08-19 | Stop reason: HOSPADM

## 2022-08-19 RX ADMIN — FENTANYL CITRATE 50 MCG: 50 INJECTION INTRAMUSCULAR; INTRAVENOUS at 10:00

## 2022-08-19 RX ADMIN — MIDAZOLAM HYDROCHLORIDE 1 MG: 1 INJECTION, SOLUTION INTRAMUSCULAR; INTRAVENOUS at 10:05

## 2022-08-19 RX ADMIN — MIDAZOLAM HYDROCHLORIDE 1 MG: 1 INJECTION, SOLUTION INTRAMUSCULAR; INTRAVENOUS at 09:59

## 2022-08-19 RX ADMIN — FENTANYL CITRATE 50 MCG: 50 INJECTION INTRAMUSCULAR; INTRAVENOUS at 10:05

## 2022-08-19 NOTE — DISCHARGE INSTRUCTIONS

## 2022-08-19 NOTE — H&P
Pre-Endoscopy History and Physical     Ernie Landeros MRN# [id number]   YOB: 1950 Age: 71 year old     Date of Procedure: 8/19/2022  Primary care provider: Nitin Chopra  Type of Endoscopy: Colonoscopy with possible biopsy, possible polypectomy  Reason for Procedure: screen  Type of Anesthesia Anticipated: Conscious Sedation    HPI:    Ernie is a 71 year old male who will be undergoing the above procedure.      A history and physical has been performed. The patient's medications and allergies have been reviewed. The risks and benefits of the procedure and the sedation options and risks were discussed with the patient.  All questions were answered and informed consent was obtained.      He denies a personal or family history of anesthesia complications or bleeding disorders.     Patient Active Problem List   Diagnosis     CARDIOVASCULAR SCREENING; LDL GOAL LESS THAN 130     ACP (advance care planning)     GERD (gastroesophageal reflux disease)     BPH (benign prostatic hyperplasia)        Past Medical History:   Diagnosis Date     BPH (benign prostatic hyperplasia)      CARDIOVASCULAR SCREENING; LDL GOAL LESS THAN 130      GERD (gastroesophageal reflux disease)      Mumps         Past Surgical History:   Procedure Laterality Date     COLONOSCOPY       HC REMOVE TONSILS/ADENOIDS,<13 Y/O  1958    T & A <13y.o.     HC REPAIR ING HERNIA,5+Y/O,REDUCIBL  1955    bilateral inguinal hernia     STRESS TEST  04/2018    normal - [facility]     SURGICAL HISTORY OF -   08/2005    Lt Knee tibial plateua fx - dr morales     SURGICAL HISTORY OF -   2009    Lt carpal tunnel - dr morales     SURGICAL HISTORY OF -   01/2014    Rt carpal tunnel - dr morales       Social History     Tobacco Use     Smoking status: Never Smoker     Smokeless tobacco: Never Used   Substance Use Topics     Alcohol use: No       Family History   Problem Relation Age of Onset     Arthritis Mother      Heart Failure Mother      Neurologic Disorder  Father         Parkinsons     Hypertension Father      Heart Disease Maternal Grandfather      Hypertension Brother      Lipids Brother      Pancreatic Cancer Brother      Seizure Disorder Brother      Other - See Comments Brother         hit by car at age 4     Colon Cancer No family hx of        Prior to Admission medications    Medication Sig Start Date End Date Taking? Authorizing Provider   bisacodyl (DULCOLAX) 5 MG EC tablet Take two (2) tablet at 4 pm the day before your procedure.  If your procedure is before 11 am, take two (2) additional tablets at 8 pm.  If your procedure is after 11 am, take two (2) additional tablets at 6 am. For additional instructions refer to your colonoscopy prep instructions. 8/3/22  Yes David Ortiz MD   polyethylene glycol (GOLYTELY) 236 g suspension The night before the exam: at 6 pm start drinking an 8-ounce glass every 15 minutes until the jug is half empty (about 8 glasses). Day of exam: 6 hours before your exam check-in Drink the other half of the jug. You should finish the prep 4 hours before the exam. For additional instructions refer to your colonoscopy prep instructions. 8/3/22  Yes David Ortiz MD   ascorbic acid (VITAMIN C) 500 MG tablet Take 500 mg by mouth every other day  5/1/12   Nitin Chopra MD   aspirin  MG EC tablet Take 325 mg by mouth every other day  12/21/16   Nitni Chopra MD   calcium carbonate (OS-MICHELLE 500 MG [place]) 500 MG tablet Take 1 tablet by mouth every other day    Unknown, Entered By History   cyanocobalamin (VITAMIN  B-12) 1000 MCG tablet Take 1,000 mcg by mouth every other day    Unknown, Entered By History   FISH OIL 1000 MG OR CAPS 1 tablet by mouth every other day 5/28/08   Nitin Chopra MD   GARLIC 100 MG OR TABS 1 tablet every other day 5/28/08   Nitin Chopra MD   glucosamine-chondroitin 500-400 MG CAPS per capsule Take 1 capsule by mouth every other day    Unknown, Entered By History   TURMERIC PO Take 1 tablet by mouth  "every other day    Unknown, Entered By History       No Known Allergies     REVIEW OF SYSTEMS:   5 point ROS negative except as noted above in HPI, including Gen., Resp., CV, GI &  system review.    PHYSICAL EXAM:   There were no vitals taken for this visit. Estimated body mass index is 25.54 kg/m  as calculated from the following:    Height as of 7/21/22: 1.778 m (5' 10\").    Weight as of 7/21/22: 80.7 kg (178 lb).   GENERAL APPEARANCE: alert, and oriented  MENTAL STATUS: alert  AIRWAY EXAM: Mallampatti Class I (visualization of the soft palate, fauces, uvula, anterior and posterior pillars)  RESP: lungs clear to auscultation - no rales, rhonchi or wheezes  CV: regular rates and rhythm  DIAGNOSTICS:    Not indicated    IMPRESSION   ASA Class 2 - Mild systemic disease    PLAN:   Plan for Colonoscopy with possible biopsy, possible polypectomy. We discussed the risks, benefits and alternatives and the patient wished to proceed.    The above has been forwarded to the consulting provider.      Signed Electronically by: David Ortiz MD  August 19, 2022          "

## 2022-08-19 NOTE — LETTER
[facility] ENDOSCOPY [facility]    [address] [place] [address]  Phone: [phone]  Fax: [phone]           August 3, 2022                      Ernie Landeros  [address] [facility] [address]          Dear Ernie Landeros,        Thank you for choosing [facility]. You are scheduled for the following service(s).   Please be aware that coverage of these services is subject to the terms and limitations of your health insurance plan.  Call member services at your health plan with any benefit or coverage questions.    Scheduled Endoscopy Procedure(s): Colonoscopy     Date: 8/19/2022  Scheduled MD: Dr. David Ortiz          Arrival Time:   9:15  *Enter and check in at the Main Hospital Entrance  Procedure Time:  10:00       Location:   [facility]        Endoscopy Department, First Floor *         [address] [place], [place] [address] [phone] () or [phone] to reschedule                  STANDARD Golytely (Colyte, Nulytely)  Prep Instructions for your Colonoscopy  Please read these instructions carefully at least 7 days prior to your colonoscopy procedure. Be sure to follow all directions completely. The inside of your colon must be clean to allow for a complete examination for the presence of any growths, polyps, and/or abnormalities, as well as their biopsy or removal. A number of tips are included in order to make this part of the procedure as comfortable as possible.    Getting ready:     A nurse will call you approximately 1 week before your exam to prescribe your bowel prep and review the prep instructions with you.    You must arrange for an adult to drive you home after your exam. Your colonoscopy cannot be done unless you have a ride. If you need to use public transportation, someone must ride with you and stay with you for a minimum of 6-24 hours.    Check with your insurance company to be  sure they will cover this exam.    7 days before the exam:    Talk to your doctor:  If you take blood-thinners (such as Coumadin, Plavix, Xarelto), your prescription or schedule may need to change before the test.    Stop taking fiber supplements, multi-vitamins with iron, and medicines that contain iron.    Continue taking prescribed aspirin; talk to your prescribing doctor with any concerns.    Stop eating corn, nuts and foods with seeds.  These can stay in the colon for days.    If you have diabetes:  Ask to have your exam early in the morning.  Also, ask your doctor if you should change your diet or medicines.    3 days before the exam:    Begin a low-fiber diet: No raw fruits or vegetables, whole wheat, seeds, nuts, popcorn or other high-fiber foods (see list on page). No binding agents: (bran, Metamucil, Fibercon) and no Olestra (a fat substitute).    One day before the exam:    You can have a light, low-fiber breakfast. But drink only clear liquids after 9 a.m. (see list below). Drink at least 8 to 10 full glasses of clear liquids during the day.     Fill the jug that contains the Golytely powder with warm water. Cover and shake until well mixed. Use a full gallon of water. Chill for 3 hours, but do not add ice.    You will start drinking half of the Golytely solution at 6 p.m. You should drink the other half about 6 hours before your exam. So, the timing of Step 2 will depend on your exam time.     After you start drinking the solution, stay near a toilet. You may have watery stools (diarrhea), mild cramping, bloating , and nausea.     Step One:  o At 3 p.m., take 2 tablets of Dulcolax (bisacodyl).  o At 6 p.m. start drinking the Golytely solution. Drink an 8-ounce glass every 15 minutes until the jug is half empty. Drink each glass quickly.     Step Two:   If you arrive before 11 AM:  At 11 p.m. on the day before your exam:    Take 2 Dulcolax (Bisacodyl) tablets.     Start drinking the other half of the  Golytely jug. Drink one 8-ounce glass every 15 minutes until the jug is empty. Drink each glass quickly.  If you arrive after 11 AM:  At 6 AM on the day of the exam:    Take 2 tablets of Dulcolax (Bisacodyl).   Drink the other half of the Golytyel jug.   Drink one 8-ounce glass every 15 minutes until the jug is empty.   Drink each glass quickly.   You should finish the prep 4 hours before the exam.      Day of exam:      You may drink clear liquids only up until 4 hours before your exam.    Do not drink anything 4 hours before your exam, not even water. (If you must take medicine, you can take it with a sip of water.)     Do not chew or swallow anything including water or gum for at least 4 hours before your exam. If you do, we may cancel the exam for your safety.     Do not take diabetes medicine by mouth until after your exam.    If you have asthma, bring your inhaler.    Arrive with an adult who will take you home after your test. The medicine used will make you sleepy. If you don't have someone to take you home, we will cancel your test.       CLEAR LIQUIDS   You may have:    Water, tea, coffee (no milk or cream)    Soda pop, Gatorade (not red or purple)    Jell-O, Popsicles (no milk or fruit pieces - not red or purple)    Fat-free soup broth or bouillon    Plain hard candy, such as clear life savers (not red or purple)    Clear juices and fruit-flavored drinks, such as apple juice, white grape juice, Hi-C, and Castillo-Aid (not red or purple)   Do not have:    Milk or milk products such as ice cream, malts or shakes, or coffee creamer    Red or purple drinks of any kind such as cranberry juice or grape juice. Avoid red or purple Jell-O, Popsicles, Castillo-Aid, sorbet, sherbet and candy    Juices with pulp such as orange, grapefruit, pineapple or tomato juice    Cream soups of any kind    Alcohol and beer    Protein drinks or protein powder     LOW FIBER DIET   You may have:      Starches: White bread, rolls, biscuits,  croissants, Ora toast, white flour tortillas, waffles, pancakes, Iranian toast; white rice, noodles, pasta, macaroni; cooked and peeled potatoes; plain crackers, saltines; cooked farina or cream of rice; puffed rice, corn flakes, Rice Krispies, Special K     Vegetables: tender cooked and canned, vegetable broths    Fruits and fruit juices: Strained fruit juice, canned fruit without seeds or skin (not pineapple), applesauce, pear sauce, ripe bananas, melons (not watermelon)     Milk products: Milk (plain or flavored), cheese, cottage cheese, yogurt (no berries), custard, ice cream      Proteins: Tender, well-cooked ground beef, lamb, veal, ham, pork, chicken, turkey, fish or organ meats; eggs; creamy peanut butter     Fats and condiments:  Margarine, butter, oils, mayonnaise, sour cream, salad dressing, plain gravy; spices, cooked herbs; sugar, clear jelly, honey, syrup     Snacks, sweets and drinks: Pretzels, hard candy; plain cakes and cookies (no nuts or seeds); gelatin, plain pudding, sherbet, Popsicles; coffee, tea, carbonated ( fizzy ) drinks Do not have:      Starches: Breads or rolls that contain nuts, seeds or fruit; whole wheat or whole grain breads that contain more than 1 gram of fiber per slice; cornbread; corn or whole wheat tortillas; potatoes with skin; brown rice, wild rice, kasha (buckwheat), and oatmeal     Vegetables: Any raw or steamed vegetables; vegetables with seeds; corn in any form   Fruits and fruit juices: Prunes, prune juice, raisins and other dried fruits, berries and other fruits with seeds, canned pineapple juices with pulp such as orange, grapefruit, pineapple or tomato juice    Milk products: Any yogurt with nuts, seeds or berries     Proteins: Tough, fibrous meats with gristle; cooked dried beans, peas or lentils; crunchy peanut butter    Fats and condiments: Pickles, olives, relish, horseradish; jam, marmalade, preserves     Snacks, sweets and drinks: Popcorn, nuts, seeds,  granola, coconut, candies made with nuts or seeds; all desserts that contain nuts, seeds, raisins and other dried fruits, coconut, whole grains or bran.        FAQ:      How do you know if your colon is cleaned out?   o After completing the bowel prep, your bowel movements should be all liquid and yellow. Your bowel movements will look similar to urine in the toilet. If there are pieces of stool (poop) in the toilet, or if you can't see to the bottom of the toilet, please call our office for advice. Call [phone] and ask to speak with a nurse.     Why do you need a responsible  to take you home and stay with you?  o We require a responsible adult to take you home for your safety. The sedation medicines used to relax you during the procedure can impair your judgement and reaction time, make you forgetful and possible a little unsteady. Do not drive, make any important decisions, or sign any legal documents for 24 hours after your procedure.     It is normal to feel bloated and gassy after your procedure. Walking will help move the air through your colon. You can take non-aspirin pain relievers that contain acetaminophen (Tylenol).     When can you eat after your procedure?  o You may resume your normal diet when you feel ready, unless advised otherwise by the doctor performing your procedure. Do not drink alcohol for 24 hours after your procedure.     You many resume normal activities (work, exercise, etc.) after 24 hours.     When will you get test results?  o You should have your procedure results and any lab results (if applicable) by letter, Qintihart message, or phone call within 2 weeks. If you have any questions, please call the doctor that referred you for the procedure.       Thank you for choosing [facility], for your procedure. If you are sent a survey regarding your care, please take the time to complete the questionnaire. We value your feedback!             Updated: 6/22/2022

## 2022-10-23 ENCOUNTER — HEALTH MAINTENANCE LETTER (OUTPATIENT)
Age: 72
End: 2022-10-23

## 2023-06-06 ENCOUNTER — TRANSFERRED RECORDS (OUTPATIENT)
Dept: HEALTH INFORMATION MANAGEMENT | Facility: CLINIC | Age: 73
End: 2023-06-06
Payer: COMMERCIAL

## 2023-07-18 ASSESSMENT — ENCOUNTER SYMPTOMS
ABDOMINAL PAIN: 0
CHILLS: 0
SHORTNESS OF BREATH: 0
WEAKNESS: 0
EYE PAIN: 0
HEARTBURN: 0
MYALGIAS: 0
HEMATOCHEZIA: 0
ARTHRALGIAS: 1
DYSURIA: 0
CONSTIPATION: 0
HEADACHES: 0
FREQUENCY: 1
COUGH: 0
PARESTHESIAS: 0
DIZZINESS: 0
FEVER: 0
HEMATURIA: 0
NAUSEA: 0
DIARRHEA: 0
PALPITATIONS: 0
NERVOUS/ANXIOUS: 0
SORE THROAT: 0
JOINT SWELLING: 0

## 2023-07-18 ASSESSMENT — ACTIVITIES OF DAILY LIVING (ADL): CURRENT_FUNCTION: NO ASSISTANCE NEEDED

## 2023-07-21 PROCEDURE — 82274 ASSAY TEST FOR BLOOD FECAL: CPT | Performed by: FAMILY MEDICINE

## 2023-07-25 ENCOUNTER — OFFICE VISIT (OUTPATIENT)
Dept: FAMILY MEDICINE | Facility: CLINIC | Age: 73
End: 2023-07-25
Payer: COMMERCIAL

## 2023-07-25 VITALS
HEIGHT: 69 IN | TEMPERATURE: 95.7 F | HEART RATE: 73 BPM | BODY MASS INDEX: 25.92 KG/M2 | RESPIRATION RATE: 14 BRPM | SYSTOLIC BLOOD PRESSURE: 118 MMHG | DIASTOLIC BLOOD PRESSURE: 80 MMHG | OXYGEN SATURATION: 96 % | WEIGHT: 175 LBS

## 2023-07-25 DIAGNOSIS — R39.12 BENIGN PROSTATIC HYPERPLASIA WITH WEAK URINARY STREAM: ICD-10-CM

## 2023-07-25 DIAGNOSIS — Z00.00 ROUTINE GENERAL MEDICAL EXAMINATION AT A HEALTH CARE FACILITY: Primary | ICD-10-CM

## 2023-07-25 DIAGNOSIS — Z13.6 CARDIOVASCULAR SCREENING; LDL GOAL LESS THAN 130: ICD-10-CM

## 2023-07-25 DIAGNOSIS — Z12.5 SCREENING FOR PROSTATE CANCER: ICD-10-CM

## 2023-07-25 DIAGNOSIS — Z12.11 SCREEN FOR COLON CANCER: ICD-10-CM

## 2023-07-25 DIAGNOSIS — Z00.00 MEDICARE ANNUAL WELLNESS VISIT, SUBSEQUENT: ICD-10-CM

## 2023-07-25 DIAGNOSIS — K21.9 GASTROESOPHAGEAL REFLUX DISEASE WITHOUT ESOPHAGITIS: ICD-10-CM

## 2023-07-25 DIAGNOSIS — Z23 NEED FOR PNEUMOCOCCAL VACCINATION: ICD-10-CM

## 2023-07-25 DIAGNOSIS — N40.1 BENIGN PROSTATIC HYPERPLASIA WITH WEAK URINARY STREAM: ICD-10-CM

## 2023-07-25 LAB
ALBUMIN SERPL BCG-MCNC: 4.2 G/DL (ref 3.5–5.2)
ALBUMIN UR-MCNC: NEGATIVE MG/DL
ALP SERPL-CCNC: 66 U/L (ref 40–129)
ALT SERPL W P-5'-P-CCNC: 21 U/L (ref 0–70)
ANION GAP SERPL CALCULATED.3IONS-SCNC: 10 MMOL/L (ref 7–15)
APPEARANCE UR: CLEAR
AST SERPL W P-5'-P-CCNC: 50 U/L (ref 0–45)
BILIRUB SERPL-MCNC: 0.8 MG/DL
BILIRUB UR QL STRIP: NEGATIVE
BUN SERPL-MCNC: 18.7 MG/DL (ref 8–23)
CALCIUM SERPL-MCNC: 9.5 MG/DL (ref 8.8–10.2)
CHLORIDE SERPL-SCNC: 102 MMOL/L (ref 98–107)
CHOLEST SERPL-MCNC: 169 MG/DL
CK SERPL-CCNC: 210 U/L (ref 39–308)
COLOR UR AUTO: YELLOW
CREAT SERPL-MCNC: 1.17 MG/DL (ref 0.67–1.17)
CREAT UR-MCNC: 63.8 MG/DL
DEPRECATED HCO3 PLAS-SCNC: 25 MMOL/L (ref 22–29)
ERYTHROCYTE [DISTWIDTH] IN BLOOD BY AUTOMATED COUNT: 12.4 % (ref 10–15)
GFR SERPL CREATININE-BSD FRML MDRD: 67 ML/MIN/1.73M2
GLUCOSE SERPL-MCNC: 95 MG/DL (ref 70–99)
GLUCOSE UR STRIP-MCNC: NEGATIVE MG/DL
HCT VFR BLD AUTO: 41.7 % (ref 40–53)
HDLC SERPL-MCNC: 39 MG/DL
HGB BLD-MCNC: 14.1 G/DL (ref 13.3–17.7)
HGB UR QL STRIP: NEGATIVE
HOLD SPECIMEN: NORMAL
KETONES UR STRIP-MCNC: NEGATIVE MG/DL
LDLC SERPL CALC-MCNC: 103 MG/DL
LEUKOCYTE ESTERASE UR QL STRIP: NEGATIVE
MCH RBC QN AUTO: 29 PG (ref 26.5–33)
MCHC RBC AUTO-ENTMCNC: 33.8 G/DL (ref 31.5–36.5)
MCV RBC AUTO: 86 FL (ref 78–100)
MICROALBUMIN UR-MCNC: <12 MG/L
MICROALBUMIN/CREAT UR: NORMAL MG/G{CREAT}
NITRATE UR QL: NEGATIVE
NONHDLC SERPL-MCNC: 130 MG/DL
PH UR STRIP: 6.5 [PH] (ref 5–7)
PLATELET # BLD AUTO: 278 10E3/UL (ref 150–450)
POTASSIUM SERPL-SCNC: 4.5 MMOL/L (ref 3.4–5.3)
PROT SERPL-MCNC: 7.1 G/DL (ref 6.4–8.3)
PSA SERPL DL<=0.01 NG/ML-MCNC: 0.78 NG/ML (ref 0–6.5)
RBC # BLD AUTO: 4.86 10E6/UL (ref 4.4–5.9)
SODIUM SERPL-SCNC: 137 MMOL/L (ref 136–145)
SP GR UR STRIP: 1.01 (ref 1–1.03)
TRIGL SERPL-MCNC: 136 MG/DL
TSH SERPL DL<=0.005 MIU/L-ACNC: 2.12 UIU/ML (ref 0.3–4.2)
UROBILINOGEN UR STRIP-ACNC: 0.2 E.U./DL
WBC # BLD AUTO: 8.9 10E3/UL (ref 4–11)

## 2023-07-25 PROCEDURE — 36415 COLL VENOUS BLD VENIPUNCTURE: CPT | Performed by: FAMILY MEDICINE

## 2023-07-25 PROCEDURE — 82550 ASSAY OF CK (CPK): CPT | Performed by: FAMILY MEDICINE

## 2023-07-25 PROCEDURE — 80053 COMPREHEN METABOLIC PANEL: CPT | Performed by: FAMILY MEDICINE

## 2023-07-25 PROCEDURE — 80061 LIPID PANEL: CPT | Performed by: FAMILY MEDICINE

## 2023-07-25 PROCEDURE — G0103 PSA SCREENING: HCPCS | Performed by: FAMILY MEDICINE

## 2023-07-25 PROCEDURE — 90677 PCV20 VACCINE IM: CPT | Performed by: FAMILY MEDICINE

## 2023-07-25 PROCEDURE — 82570 ASSAY OF URINE CREATININE: CPT | Performed by: FAMILY MEDICINE

## 2023-07-25 PROCEDURE — 81003 URINALYSIS AUTO W/O SCOPE: CPT | Performed by: FAMILY MEDICINE

## 2023-07-25 PROCEDURE — G0009 ADMIN PNEUMOCOCCAL VACCINE: HCPCS | Performed by: FAMILY MEDICINE

## 2023-07-25 PROCEDURE — G0439 PPPS, SUBSEQ VISIT: HCPCS | Performed by: FAMILY MEDICINE

## 2023-07-25 PROCEDURE — 99213 OFFICE O/P EST LOW 20 MIN: CPT | Mod: 25 | Performed by: FAMILY MEDICINE

## 2023-07-25 PROCEDURE — 82043 UR ALBUMIN QUANTITATIVE: CPT | Performed by: FAMILY MEDICINE

## 2023-07-25 PROCEDURE — 85027 COMPLETE CBC AUTOMATED: CPT | Performed by: FAMILY MEDICINE

## 2023-07-25 PROCEDURE — 84443 ASSAY THYROID STIM HORMONE: CPT | Performed by: FAMILY MEDICINE

## 2023-07-25 ASSESSMENT — ENCOUNTER SYMPTOMS
CHILLS: 0
HEADACHES: 0
DYSURIA: 0
SHORTNESS OF BREATH: 0
FEVER: 0
PARESTHESIAS: 0
MYALGIAS: 0
HEMATURIA: 0
FREQUENCY: 1
HEMATOCHEZIA: 0
JOINT SWELLING: 0
PALPITATIONS: 0
HEARTBURN: 0
COUGH: 0
DIARRHEA: 0
NERVOUS/ANXIOUS: 0
WEAKNESS: 0
ABDOMINAL PAIN: 0
SORE THROAT: 0
NAUSEA: 0
EYE PAIN: 0
DIZZINESS: 0
ARTHRALGIAS: 1
CONSTIPATION: 0

## 2023-07-25 ASSESSMENT — ACTIVITIES OF DAILY LIVING (ADL): CURRENT_FUNCTION: NO ASSISTANCE NEEDED

## 2023-07-25 NOTE — PROGRESS NOTES
"[facility] Isauro Nunez is a 72 year old who presents for Preventive Visit.       No data to display              Are you in the first 12 months of your Medicare coverage?  No    Healthy Habits:     In general, how would you rate your overall health?  Excellent    Frequency of exercise:  4-5 days/week    Duration of exercise:  30-45 minutes    Do you usually eat at least 4 servings of fruit and vegetables a day, include whole grains    & fiber and avoid regularly eating high fat or \"junk\" foods?  Yes    Taking medications regularly:  Yes    Medication side effects:  Not applicable    Ability to successfully perform activities of daily living:  No assistance needed    Home Safety:  No safety concerns identified    Hearing Impairment:  No hearing concerns    In the past 6 months, have you been bothered by leaking of urine?  No    In general, how would you rate your overall mental or emotional health?  Excellent    Have you ever done Advance Care Planning? (For example, a Health Directive, POLST, or a discussion with a medical provider or your loved ones about your wishes): Yes, advance care planning is on file.    Fall risk  Fallen 2 or more times in the past year?: No  Any fall with injury in the past year?: No    Cognitive Screening   1) Repeat 3 items (Leader, Season, Table)    2) Clock draw: NORMAL  3) 3 item recall: Recalls 3 objects  Results: 3 items recalled: COGNITIVE IMPAIRMENT LESS LIKELY    Mini-CogTM Copyright ARNOLDO Sorenson. Licensed by the author for use in [facility]; reprinted with permission (woody@.[email or web address]). All rights reserved.      Do you have sleep apnea, excessive snoring or daytime drowsiness? : no    Reviewed and updated as needed this visit by clinical staff   Tobacco  Allergies  Meds  Problems  Med Hx  Surg Hx  Fam Hx          Reviewed and updated as needed this visit by Provider    Allergies  Meds  Problems            Social History     Tobacco " Use    Smoking status: Never    Smokeless tobacco: Never   Substance Use Topics    Alcohol use: No             7/18/2023     3:24 PM   Alcohol Use   Prescreen: >3 drinks/day or >7 drinks/week? Not Applicable     Do you have a current opioid prescription? No  Do you use any other controlled substances or medications that are not prescribed by a provider? None      Current providers sharing in care for this patient include:   Patient Care Team:  Nitin Chopra MD as PCP - General  Nitin Chopra MD as Assigned PCP    The following health maintenance items are reviewed in Epic and correct as of today:    Health Maintenance   Topic Date Due    COVID-19 Vaccine (6 - Moderna series) 04/21/2023    PSA  07/21/2023    INFLUENZA VACCINE (1) 09/01/2023    MEDICARE ANNUAL WELLNESS VISIT  07/25/2024    ANNUAL REVIEW OF HM ORDERS  07/25/2024    FALL RISK ASSESSMENT  07/25/2024    LIPID  07/21/2027    ADVANCE CARE PLANNING  07/25/2028    DTAP/TDAP/TD IMMUNIZATION (3 - Td or Tdap) 07/22/2032    HEPATITIS C SCREENING  Completed    PHQ-2 (once per calendar year)  Completed    Pneumococcal Vaccine: 65+ Years  Completed    ZOSTER IMMUNIZATION  Completed    HEPATITIS B IMMUNIZATION  Completed    AORTIC ANEURYSM SCREENING (SYSTEM ASSIGNED)  Completed    IPV IMMUNIZATION  Aged Out    MENINGITIS IMMUNIZATION  Aged Out    COLORECTAL CANCER SCREENING  Discontinued    HEPATITIS A IMMUNIZATION  Discontinued     Review of Systems   Constitutional:  Negative for chills and fever.   HENT:  Negative for congestion, ear pain, hearing loss and sore throat.    Eyes:  Negative for pain and visual disturbance.   Respiratory:  Negative for cough and shortness of breath.    Cardiovascular:  Negative for chest pain, palpitations and peripheral edema.   Gastrointestinal:  Negative for abdominal pain, constipation, diarrhea, heartburn, hematochezia and nausea.   Genitourinary:  Positive for frequency. Negative for dysuria, genital sores, hematuria, impotence,  penile discharge and urgency.   Musculoskeletal:  Positive for arthralgias. Negative for joint swelling and myalgias.   Skin:  Negative for rash.   Neurological:  Negative for dizziness, weakness, headaches and paresthesias.   Psychiatric/Behavioral:  Negative for mood changes. The patient is not nervous/anxious.      Lt knee - hx of significant injury in 2005 - reconstruction - having sporadic flares, activity related    Lipids    Recent Labs   Lab Test 07/21/22  1116 05/24/21  1030   CHOL 165 168   HDL 38* 43   LDL 97 102*   TRIG 149 113     OA - ibuprofen prn, acetaminophen prn    GERD    Occas flares with certain foods  - occas Tums on average 2/day with good control    BPH    Nocturia 3 times per night, decreased urine flow    Health Maintenance     Colonoscopy:  last 8/2022, consider 8/2023   FIT:  given              PSA:  pending   DEXA:  NA    Health Maintenance Due   Topic Date Due    COVID-19 Vaccine (6 - Moderna series) 04/21/2023    PSA  07/21/2023       Current Problem List    Patient Active Problem List   Diagnosis    CARDIOVASCULAR SCREENING; LDL GOAL LESS THAN 130    ACP (advance care planning)    GERD (gastroesophageal reflux disease)    BPH (benign prostatic hyperplasia)       Past Medical History    Past Medical History:   Diagnosis Date    Arthritis     BPH (benign prostatic hyperplasia)     CARDIOVASCULAR SCREENING; LDL GOAL LESS THAN 130     GERD (gastroesophageal reflux disease)     Mumps        Past Surgical History    Past Surgical History:   Procedure Laterality Date    COLONOSCOPY Left 08/19/2022    Procedure: COLONOSCOPY (fv);  Surgeon: David Ortiz MD;  Location:  GI    HC REMOVE TONSILS/ADENOIDS,<11 Y/O  1958    T & A <13y.o.    HC REPAIR ING HERNIA,5+Y/O,REDUCIBL  1955    bilateral inguinal hernia    STRESS TEST  04/2018    normal - [facility]    SURGICAL HISTORY OF -   08/2005    Lt Knee tibial plateua fx - Dr Wrigth    SURGICAL HISTORY OF -   2009    Lt carpal tunnel - Dr Wright     SURGICAL HISTORY OF -   01/2014    Rt carpal tunnel - Dr Wright       Current Medications    Current Outpatient Medications   Medication Sig Dispense Refill    ascorbic acid (VITAMIN C) 500 MG tablet Take 500 mg by mouth every other day  100 tablet 12    aspirin  MG EC tablet Take 325 mg by mouth every other day  90 tablet 3    calcium carbonate (OS-MICHELLE 500 MG [place]. CA) 500 MG tablet Take 1 tablet by mouth every other day      cyanocobalamin (VITAMIN  B-12) 1000 MCG tablet Take 1,000 mcg by mouth every other day      FISH OIL 1000 MG OR CAPS 1 tablet by mouth every other day  0    GARLIC 100 MG OR TABS 1 tablet every other day  0    glucosamine-chondroitin 500-400 MG CAPS per capsule Take 1 capsule by mouth every other day      TURMERIC PO Take 1 tablet by mouth every other day         Allergies    No Known Allergies    Immunizations    Immunization History   Administered Date(s) Administered    COVID-19 Bivalent 18+ (Moderna) 12/21/2022    COVID-19 Monovalent 18+ (Moderna) 03/01/2021, 03/29/2021, 07/22/2022    COVID-19 Monovalent Booster 18+ (Moderna) 11/08/2021    FLU 6-35 months 09/22/2010, 09/12/2012    Influenza (High Dose) 3 valent vaccine 09/07/2017, 10/01/2017, 09/11/2018, 09/09/2019    Influenza (IIV3) PF 11/25/2003, 10/17/2006, 10/01/2009, 09/22/2010, 09/09/2011, 09/19/2012, 10/03/2016    Influenza Vaccine 65+ (Fluzone HD) 09/08/2020, 09/07/2021, 09/30/2022    Influenza Vaccine >6 months (Alfuria,Fluzone) 09/18/2013, 09/18/2014, 10/05/2015    Influenza Vaccine IM Ages 6-35 Months 4 Valent (PF) 10/01/2017    Pneumo Conj 13-V (2010&after) 12/21/2016    Pneumococcal 20 valent Conjugate (Prevnar 20) 07/25/2023    Pneumococcal 23 valent 05/02/2014, 05/22/2019    TD,PF 7+ (Tenivac) 10/17/2003    TDAP (Adacel,Boostrix) 07/22/2022    TDAP Vaccine (Boostrix) 05/02/2012    Td (Adult), Adsorbed 10/17/2003    Twinrix A/B 10/17/2003, 11/25/2003, 04/16/2004    Typhoid IM 10/17/2003    Zoster recombinant  adjuvanted (SHINGRIX) 05/04/2018, 07/23/2018    Zoster vaccine, live 10/05/2015       Family History    Family History   Problem Relation Age of Onset    Arthritis Mother     Heart Failure Mother     Neurologic Disorder Father         Parkinsons    Hypertension Father     Heart Disease Maternal Grandfather     Hypertension Brother     Lipids Brother     Pancreatic Cancer Brother     Seizure Disorder Brother     Other - See Comments Brother         hit by car at age 4    Colon Cancer No family hx of        Social History    Social History     Socioeconomic History    Marital status:      Spouse name: Ariana    Number of children: 2    Years of education: 18    Highest education level: Not on file   Occupational History     Employer: SELF   Tobacco Use    Smoking status: Never    Smokeless tobacco: Never   Vaping Use    Vaping Use: Never used   Substance and Sexual Activity    Alcohol use: No    Drug use: No    Sexual activity: Yes     Partners: Male     Birth control/protection: None   Other Topics Concern     Service Not Asked    Blood Transfusions Not Asked    Caffeine Concern Yes     Comment: 2 cups a day    Occupational Exposure Not Asked    Hobby Hazards Not Asked    Sleep Concern Not Asked    Stress Concern Not Asked    Weight Concern Not Asked    Special Diet Not Asked    Back Care Not Asked    Exercise Yes     Comment: bikes walks lifts wts 3-5 times per week - 10 miles per week    Bike Helmet Not Asked    Seat Belt Yes    Self-Exams Not Asked    Parent/sibling w/ CABG, MI or angioplasty before 65F 55M? No   Social History Narrative    Not on file     Social Determinants of Health     Financial Resource Strain: Not on file   Food Insecurity: Not on file   Transportation Needs: Not on file   Physical Activity: Not on file   Stress: Not on file   Social Connections: Not on file   Intimate Partner Violence: Not on file   Housing Stability: Not on file       ROS    CONSTITUTIONAL: NEGATIVE for  "fever, chills, change in weight  INTEGUMENTARY/SKIN: NEGATIVE for worrisome rashes, moles or lesions  EYES: NEGATIVE for vision changes or irritation  ENT/MOUTH: NEGATIVE for ear, mouth and throat problems  RESP: NEGATIVE for significant cough or SOB  BREAST: NEGATIVE for masses, tenderness or discharge  CV: NEGATIVE for chest pain, palpitations or peripheral edema  GI: NEGATIVE for nausea, abdominal pain, heartburn, or change in bowel habits  : NEGATIVE for frequency, dysuria, or hematuria  MUSCULOSKELETAL: NEGATIVE for significant arthralgias or myalgia  NEURO: NEGATIVE for weakness, dizziness or paresthesias  ENDOCRINE: NEGATIVE for temperature intolerance, skin/hair changes  HEME: NEGATIVE for bleeding problems  PSYCHIATRIC: NEGATIVE for changes in mood or affect    OBJECTIVE    /80   Pulse 73   Temp (!) 95.7  F (35.4  C) (Tympanic)   Resp 14   Ht 1.753 m (5' 9\")   Wt 79.4 kg (175 lb)   SpO2 96%   BMI 25.84 kg/m      EXAM:    GENERAL: healthy, alert and no distress  EYES: Eyes grossly normal to inspection, PERRL and conjunctivae and sclerae normal  HENT: ear canals and TM's normal, nose and mouth without ulcers or lesions  NECK: no adenopathy, no asymmetry, masses, or scars and thyroid normal to palpation  RESP: lungs clear to auscultation - no rales, rhonchi or wheezes  CV: regular rate and rhythm, normal S1 S2, no S3 or S4, no murmur, click or rub, no peripheral edema and peripheral pulses strong  ABDOMEN: soft, nontender, no hepatosplenomegaly, no masses and bowel sounds normal   (male): testicles normal without atrophy or masses, no hernias, and penis normal without urethral discharge  RECTAL: normal sphincter tone, no rectal masses, prostate of normal size for age, smooth, nontender without masses/nodules, and prostate 1+ enlarged, nontender  MS: no gross musculoskeletal defects noted, no edema  SKIN: no suspicious lesions or rashes  NEURO: Normal strength and tone, mentation intact and " speech normal  PSYCH: mentation appears normal, affect normal/bright  LYMPH: no cervical, supraclavicular, axillary, or inguinal adenopathy    DIAGNOSTICS/PROCEDURES    Pending    ASSESSMENT      ICD-10-CM    1. Routine general medical examination at a health care facility  Z00.00 Comprehensive metabolic panel     Lipid panel reflex to direct LDL Fasting     CBC with platelets     CK total     UA Macroscopic with reflex to Microscopic and Culture     Albumin Random Urine Quantitative with Creat Ratio     TSH with free T4 reflex     Prostate Specific Antigen Screen     Fecal colorectal cancer screen FIT     Comprehensive metabolic panel     Lipid panel reflex to direct LDL Fasting     CBC with platelets     CK total     UA Macroscopic with reflex to Microscopic and Culture     Albumin Random Urine Quantitative with Creat Ratio     TSH with free T4 reflex     Prostate Specific Antigen Screen     Fecal colorectal cancer screen FIT     Extra SST Tube (LAB USE ONLY)      2. Medicare annual wellness visit, subsequent  Z00.00 Comprehensive metabolic panel     Lipid panel reflex to direct LDL Fasting     CBC with platelets     CK total     UA Macroscopic with reflex to Microscopic and Culture     Albumin Random Urine Quantitative with Creat Ratio     TSH with free T4 reflex     Prostate Specific Antigen Screen     Fecal colorectal cancer screen FIT     Comprehensive metabolic panel     Lipid panel reflex to direct LDL Fasting     CBC with platelets     CK total     UA Macroscopic with reflex to Microscopic and Culture     Albumin Random Urine Quantitative with Creat Ratio     TSH with free T4 reflex     Prostate Specific Antigen Screen     Fecal colorectal cancer screen FIT      3. CARDIOVASCULAR SCREENING; LDL GOAL LESS THAN 130  Z13.6 Comprehensive metabolic panel     Lipid panel reflex to direct LDL Fasting     CK total     Comprehensive metabolic panel     Lipid panel reflex to direct LDL Fasting     CK total      4.  Gastroesophageal reflux disease without esophagitis  K21.9 CBC with platelets     CBC with platelets      5. Benign prostatic hyperplasia with weak urinary stream  N40.1 Prostate Specific Antigen Screen    R39.12 Prostate Specific Antigen Screen      6. Screening for prostate cancer  Z12.5 Prostate Specific Antigen Screen     Prostate Specific Antigen Screen      7. Screen for colon cancer  Z12.11 Fecal colorectal cancer screen FIT     Fecal colorectal cancer screen FIT      8. Need for pneumococcal vaccination  Z23 PNEUMOCOCCAL 20 VALENT CONJUGATE (PREVNAR 20)          PLAN    Discussed treatment/modality options, including risk and benefits, he desires:    advised alcohol consumption 1oz per day or less, advised 1 multivitamin per day, advised calcium 1595-8092 mg/d and Vitamin D 800-1200 IU/d, advised dentist every 6 months, advised diet and exercise, advised opthalmologist every 1-2 years, advised self testicular exam q month, further health care maintenance, further lab(s), immunization(s), and observation    Discussed controversies surrounding PSA. Specifically reviewed 2017 USPSTF findings recommending discussion of PSA testing for men ages 55-69.  Reviewed findings of the  Randomized Study of Screening for Prostate Cancer which showed a 30% reduction in advanced stage prostate cancer and a 20% reduction in death rate from prostate cancer in this age group. PSA-based screening may prevent up to 2 deaths and up to 3 cases of metastatic disease per 1,000 men screened over 13 years.    We've elected to do PSA this year after discussing these controversies.    All diagnosis above reviewed and noted above, otherwise stable.      See Instant Opinion orders for further details.      1) labs pending    2) immunizations reviewed    3) [place]opedics - considering in  [place]    4) consider pepcid/prilosec    No follow-ups on file.    Health Maintenance Due   Topic Date Due    COVID-19 Vaccine (6 - Moderna series) 04/21/2023  "   PSA  07/21/2023       COUNSELING    Reviewed preventive health counseling, as reflected in patient instructions    BP Readings from Last 1 Encounters:   07/25/23 118/80     Estimated body mass index is 25.84 kg/m  as calculated from the following:    Height as of this encounter: 1.753 m (5' 9\").    Weight as of this encounter: 79.4 kg (175 lb).    The 10-year ASCVD risk score (Sachi JOHNSTON, et al., 2019) is: 17.4%    Values used to calculate the score:      Age: 72 years      Sex: Male      Is Non- : No      Diabetic: No      Tobacco smoker: No      Systolic Blood Pressure: 118 mmHg      Is BP treated: No      HDL Cholesterol: 38 mg/dL      Total Cholesterol: 165 mg/dL     reports that he has never smoked. He has never used smokeless tobacco.    Counseling Resources:    ATP IV Guidelines  Pooled Cohorts Equation Calculator  FRAX Risk Assessment  ICSI Preventive Guidelines  Dietary Guidelines for Americans, 2010  USDA's MyPlate  ASA Prophylaxis  Lung CA Screening           Nitin Chopra MD, FAAFP     [facility]  [address] [place] [address]  [email or web address]   Office: [phone]  Fax: [phone]  Pager: [phone]   I have reviewed Opioid Use Disorder and Substance Use Disorder risk factors and made any needed referrals.   "

## 2023-07-25 NOTE — LETTER
[facility]  [address] [facility], [place] [address] [phone]                    July 31, 2023    Ernie MALDONADO Quiring  [address] [facility] [address]      Dear Ernie,    Here is a summary of your recent test results:    Labs are overall excellent     We advise:     Continue current cares.   Balanced low cholesterol diet.   Regular exercise.     Your test results are enclosed.      Please contact me if you have any questions.           Thank you very much for trusting [facility].     Healthy regards,          Nitin Chopra M.D.        Results for orders placed or performed in visit on 07/25/23   Comprehensive metabolic panel     Status: Abnormal   Result Value Ref Range    Sodium 137 136 - 145 mmol/L    Potassium 4.5 3.4 - 5.3 mmol/L    Chloride 102 98 - 107 mmol/L    Carbon Dioxide (CO2) 25 22 - 29 mmol/L    Anion Gap 10 7 - 15 mmol/L    Urea Nitrogen 18.7 8.0 - 23.0 mg/dL    Creatinine 1.17 0.67 - 1.17 mg/dL    Calcium 9.5 8.8 - 10.2 mg/dL    Glucose 95 70 - 99 mg/dL    Alkaline Phosphatase 66 40 - 129 U/L    AST 50 (H) 0 - 45 U/L    ALT 21 0 - 70 U/L    Protein Total 7.1 6.4 - 8.3 g/dL    Albumin 4.2 3.5 - 5.2 g/dL    Bilirubin Total 0.8 <=1.2 mg/dL    GFR Estimate 67 >60 mL/min/1.73m2   Lipid panel reflex to direct LDL Fasting     Status: Abnormal   Result Value Ref Range    Cholesterol 169 <200 mg/dL    Triglycerides 136 <150 mg/dL    Direct Measure HDL 39 (L) >=40 mg/dL    LDL Cholesterol Calculated 103 (H) <=100 mg/dL    Non HDL Cholesterol 130 (H) <130 mg/dL    Narrative    Cholesterol  Desirable:  <200 mg/dL    Triglycerides  Normal:  Less than 150 mg/dL  Borderline High:  150-199 mg/dL  High:  200-499 mg/dL  Very High:  Greater than or equal to 500 mg/dL    Direct Measure HDL  Female:  Greater than or equal to 50 mg/dL   Male:  Greater than or equal to 40 mg/dL    LDL Cholesterol  Desirable:  <100mg/dL  Above Desirable:  100-129 mg/dL    Borderline High:  130-159 mg/dL   High:  160-189 mg/dL   Very High:  >= 190 mg/dL    Non HDL Cholesterol  Desirable:  130 mg/dL  Above Desirable:  130-159 mg/dL  Borderline High:  160-189 mg/dL  High:  190-219 mg/dL  Very High:  Greater than or equal to 220 mg/dL   CBC with platelets     Status: Normal   Result Value Ref Range    WBC Count 8.9 4.0 - 11.0 10e3/uL    RBC Count 4.86 4.40 - 5.90 10e6/uL    Hemoglobin 14.1 13.3 - 17.7 g/dL    Hematocrit 41.7 40.0 - 53.0 %    MCV 86 78 - 100 fL    MCH 29.0 26.5 - 33.0 pg    MCHC 33.8 31.5 - 36.5 g/dL    RDW 12.4 10.0 - 15.0 %    Platelet Count 278 150 - 450 10e3/uL   CK total     Status: Normal   Result Value Ref Range     39 - 308 U/L   UA Macroscopic with reflex to Microscopic and Culture     Status: Normal    Specimen: Urine, NOS   Result Value Ref Range    Color Urine Yellow Colorless, Straw, Light Yellow, Yellow    Appearance Urine Clear Clear    Glucose Urine Negative Negative mg/dL    Bilirubin Urine Negative Negative    Ketones Urine Negative Negative mg/dL    Specific Gravity Urine 1.010 1.003 - 1.035    Blood Urine Negative Negative    pH Urine 6.5 5.0 - 7.0    Protein Albumin Urine Negative Negative mg/dL    Urobilinogen Urine 0.2 0.2, 1.0 E.U./dL    Nitrite Urine Negative Negative    Leukocyte Esterase Urine Negative Negative    Narrative    Microscopic not indicated   Albumin Random Urine Quantitative with Creat Ratio     Status: None   Result Value Ref Range    Creatinine Urine mg/dL 63.8 mg/dL    Albumin Urine mg/L <12.0 mg/L    Albumin Urine mg/g Cr     TSH with free T4 reflex     Status: Normal   Result Value Ref Range    TSH 2.12 0.30 - 4.20 uIU/mL   Prostate Specific Antigen Screen     Status: Normal   Result Value Ref Range    Prostate Specific Antigen Screen 0.78 0.00 - 6.50 ng/mL    Narrative    This result is obtained using the Roche Elecsys total PSA method on the andrey [id number] immunoassay analyzer. Results obtained with different assay  methods or kits cannot be used interchangeably.   Fecal colorectal cancer screen FIT     Status: Normal   Result Value Ref Range    Occult Blood Screen FIT Negative Negative   Extra SST Tube (LAB USE ONLY)     Status: None   Result Value Ref Range    Hold Specimen JIC

## 2023-07-26 LAB — HEMOCCULT STL QL IA: NEGATIVE

## 2023-09-06 ENCOUNTER — TRANSFERRED RECORDS (OUTPATIENT)
Dept: HEALTH INFORMATION MANAGEMENT | Facility: CLINIC | Age: 73
End: 2023-09-06
Payer: COMMERCIAL

## 2023-09-26 PROBLEM — Z85.828 HISTORY OF BASAL CELL CARCINOMA: Status: ACTIVE | Noted: 2023-09-01

## 2023-10-05 ENCOUNTER — TRANSFERRED RECORDS (OUTPATIENT)
Dept: HEALTH INFORMATION MANAGEMENT | Facility: CLINIC | Age: 73
End: 2023-10-05
Payer: COMMERCIAL

## 2023-12-18 ENCOUNTER — TRANSFERRED RECORDS (OUTPATIENT)
Dept: HEALTH INFORMATION MANAGEMENT | Facility: CLINIC | Age: 73
End: 2023-12-18
Payer: COMMERCIAL

## 2024-05-09 ENCOUNTER — TELEPHONE (OUTPATIENT)
Dept: FAMILY MEDICINE | Facility: CLINIC | Age: 74
End: 2024-05-09
Payer: COMMERCIAL

## 2024-05-09 ENCOUNTER — TRANSFERRED RECORDS (OUTPATIENT)
Dept: HEALTH INFORMATION MANAGEMENT | Facility: CLINIC | Age: 74
End: 2024-05-09
Payer: COMMERCIAL

## 2024-05-09 NOTE — TELEPHONE ENCOUNTER
General Call      Reason for Call:  Pt received a letter from [facility] on 2/1/2024    What are your questions or concerns:  Pt is wondering if PCP recommends pt get a colonoscopy at this time    Date of last appointment with provider: 7/25    Could we send this information to you in [employer] or would you prefer to receive a phone call?:   Patient would prefer a phone call   Okay to leave a detailed message?: Yes at Home number on file [phone] (home)

## 2024-05-09 NOTE — TELEPHONE ENCOUNTER
Please review- routing to Dr. Chopra     Last colonoscopy 8/19/22    I did not see a recommendation for the next colonoscopy in the  note.

## 2024-05-10 NOTE — TELEPHONE ENCOUNTER
Attempt #1  Called Phone # [phone]      Left a non detailed voicemail to please call back and ask for any available triage nurse regarding your  phone call @ [phone].     Faye TOMAS RN   [facility] Triage

## 2024-05-10 NOTE — TELEPHONE ENCOUNTER
Please notify colo rectal surgery that he had a colonoscopy 8/2022, see if they still would like to proceed

## 2024-05-10 NOTE — TELEPHONE ENCOUNTER
Patient called back and was advised of Dr. Chopra's note.     Patient states understanding and is agreeable to plan

## 2024-09-11 ENCOUNTER — TRANSFERRED RECORDS (OUTPATIENT)
Dept: HEALTH INFORMATION MANAGEMENT | Facility: CLINIC | Age: 74
End: 2024-09-11

## 2024-10-26 ENCOUNTER — HEALTH MAINTENANCE LETTER (OUTPATIENT)
Age: 74
End: 2024-10-26

## 2025-05-27 ENCOUNTER — TRANSFERRED RECORDS (OUTPATIENT)
Dept: HEALTH INFORMATION MANAGEMENT | Facility: CLINIC | Age: 75
End: 2025-05-27

## (undated) DEVICE — CLIP HEMOSTASIS ASSURANCE W16 MM BX00711884

## (undated) DEVICE — RX ELEVIEW SUBMUCOSAL ING 10ML AMP 05391530190015

## (undated) DEVICE — KIT ENDO TURNOVER/PROCEDURE W/CLEAN A SCOPE LINERS 103888

## (undated) RX ORDER — FENTANYL CITRATE 0.05 MG/ML
INJECTION, SOLUTION INTRAMUSCULAR; INTRAVENOUS
Status: DISPENSED
Start: 2022-08-19